# Patient Record
Sex: FEMALE | Race: WHITE | NOT HISPANIC OR LATINO | Employment: OTHER | URBAN - METROPOLITAN AREA
[De-identification: names, ages, dates, MRNs, and addresses within clinical notes are randomized per-mention and may not be internally consistent; named-entity substitution may affect disease eponyms.]

---

## 2019-09-20 ENCOUNTER — HOSPITAL ENCOUNTER (OUTPATIENT)
Dept: URGENT CARE | Facility: CLINIC | Age: 64
Discharge: HOME | End: 2019-09-20
Attending: FAMILY MEDICINE

## 2020-09-25 ENCOUNTER — HOSPITAL ENCOUNTER (OUTPATIENT)
Dept: URGENT CARE | Facility: CLINIC | Age: 65
Discharge: HOME | End: 2020-09-25
Attending: FAMILY MEDICINE

## 2022-01-11 ENCOUNTER — TRANSCRIBE ORDERS (OUTPATIENT)
Dept: ADMINISTRATIVE | Facility: HOSPITAL | Age: 67
End: 2022-01-11

## 2022-01-11 DIAGNOSIS — Z12.31 SCREENING MAMMOGRAM, ENCOUNTER FOR: Primary | ICD-10-CM

## 2022-02-21 ENCOUNTER — CLINICAL SUPPORT (OUTPATIENT)
Dept: GASTROENTEROLOGY | Facility: CLINIC | Age: 67
End: 2022-02-21

## 2022-02-21 RX ORDER — LORATADINE 10 MG/1
10 TABLET ORAL DAILY
COMMUNITY
Start: 2022-01-10 | End: 2022-11-12

## 2022-02-21 RX ORDER — PAROXETINE HYDROCHLORIDE 20 MG/1
10 TABLET, FILM COATED ORAL NIGHTLY
COMMUNITY
Start: 2022-01-10

## 2022-02-22 ENCOUNTER — APPOINTMENT (OUTPATIENT)
Dept: MAMMOGRAPHY | Facility: HOSPITAL | Age: 67
End: 2022-02-22

## 2022-02-25 ENCOUNTER — APPOINTMENT (OUTPATIENT)
Dept: MAMMOGRAPHY | Facility: HOSPITAL | Age: 67
End: 2022-02-25

## 2022-03-07 ENCOUNTER — APPOINTMENT (OUTPATIENT)
Dept: MAMMOGRAPHY | Facility: HOSPITAL | Age: 67
End: 2022-03-07

## 2022-03-15 ENCOUNTER — OFFICE VISIT (OUTPATIENT)
Dept: GASTROENTEROLOGY | Facility: CLINIC | Age: 67
End: 2022-03-15

## 2022-03-15 ENCOUNTER — LAB (OUTPATIENT)
Dept: LAB | Facility: HOSPITAL | Age: 67
End: 2022-03-15

## 2022-03-15 ENCOUNTER — PREP FOR SURGERY (OUTPATIENT)
Dept: OTHER | Facility: HOSPITAL | Age: 67
End: 2022-03-15

## 2022-03-15 VITALS
DIASTOLIC BLOOD PRESSURE: 60 MMHG | WEIGHT: 136 LBS | BODY MASS INDEX: 22.66 KG/M2 | SYSTOLIC BLOOD PRESSURE: 138 MMHG | HEIGHT: 65 IN | HEART RATE: 70 BPM

## 2022-03-15 DIAGNOSIS — R19.7 DIARRHEA, UNSPECIFIED TYPE: Primary | ICD-10-CM

## 2022-03-15 DIAGNOSIS — R15.9 INCONTINENCE OF FECES WITH FECAL URGENCY: ICD-10-CM

## 2022-03-15 DIAGNOSIS — R15.2 INCONTINENCE OF FECES WITH FECAL URGENCY: ICD-10-CM

## 2022-03-15 PROBLEM — J30.1 ALLERGIC RHINITIS DUE TO POLLEN: Status: ACTIVE | Noted: 2022-03-15

## 2022-03-15 PROBLEM — E78.2 MIXED HYPERLIPIDEMIA: Status: RESOLVED | Noted: 2022-03-15 | Resolved: 2022-03-15

## 2022-03-15 PROBLEM — F41.9 ANXIETY: Status: ACTIVE | Noted: 2022-03-15

## 2022-03-15 PROBLEM — C53.9 CERVICAL CANCER (HCC): Status: ACTIVE | Noted: 2022-03-15

## 2022-03-15 PROBLEM — E78.2 MIXED HYPERLIPIDEMIA: Status: ACTIVE | Noted: 2022-03-15

## 2022-03-15 PROBLEM — E55.9 VITAMIN D DEFICIENCY: Status: ACTIVE | Noted: 2022-03-15

## 2022-03-15 LAB
ANION GAP SERPL CALCULATED.3IONS-SCNC: 9.1 MMOL/L (ref 5–15)
BUN SERPL-MCNC: 14 MG/DL (ref 8–23)
BUN/CREAT SERPL: 22.6 (ref 7–25)
CALCIUM SPEC-SCNC: 10.1 MG/DL (ref 8.6–10.5)
CHLORIDE SERPL-SCNC: 103 MMOL/L (ref 98–107)
CO2 SERPL-SCNC: 27.9 MMOL/L (ref 22–29)
CREAT SERPL-MCNC: 0.62 MG/DL (ref 0.57–1)
EGFRCR SERPLBLD CKD-EPI 2021: 97.7 ML/MIN/1.73
GLUCOSE SERPL-MCNC: 76 MG/DL (ref 65–99)
POTASSIUM SERPL-SCNC: 4.7 MMOL/L (ref 3.5–5.2)
SODIUM SERPL-SCNC: 140 MMOL/L (ref 136–145)

## 2022-03-15 PROCEDURE — 80048 BASIC METABOLIC PNL TOTAL CA: CPT | Performed by: NURSE PRACTITIONER

## 2022-03-15 PROCEDURE — 36415 COLL VENOUS BLD VENIPUNCTURE: CPT | Performed by: NURSE PRACTITIONER

## 2022-03-15 PROCEDURE — 99204 OFFICE O/P NEW MOD 45 MIN: CPT | Performed by: NURSE PRACTITIONER

## 2022-03-15 RX ORDER — SODIUM, POTASSIUM,MAG SULFATES 17.5-3.13G
1 SOLUTION, RECONSTITUTED, ORAL ORAL EVERY 12 HOURS
Qty: 354 ML | Refills: 0 | Status: ON HOLD | OUTPATIENT
Start: 2022-03-15 | End: 2022-07-12

## 2022-03-15 RX ORDER — MULTIPLE VITAMINS W/ MINERALS TAB 9MG-400MCG
1 TAB ORAL EVERY 24 HOURS
COMMUNITY

## 2022-03-15 NOTE — PROGRESS NOTES
Patient Name: Ivory Nava   Visit Date: 03/15/2022   Patient ID: [id number]  Provider: CATHI Cox    Sex: female  Location:  Location Address:  Location Phone: [address]  [place] [address] [phone]    YOB: 1955  Age: 66 y.o.   Primary Care Provider Darrian Oreilly MD      Referring Provider: No ref. provider found        Chief Complaint  Diarrhea (Sx since she was dx with cervical cancer; 2008, pt has never had a colonoscopy, was advised a Cologuard but did not complete as she did not feel comfortable send the sample, previous stool studies) and Abdominal Cramping    History of Present Illness    New pt states she has had diarrhea since 2008 after having chemo/radiation for cervical cancer. Pt states she may have normal stools as well, alt w diarrhea, but may have FI w urge at times.   No abd pain, does have cramps before diarrhea. No blood in stool or black stool, no n/v, good appetite.   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 Stable weight recently.   [place] #5 usually. Takes OTC loperamide PRN Stool has been oily at times.   Pt states she has never had a colonoscopy. She requested not to have unless last resort.      Past Medical History:   Diagnosis Date   • Anxiety    • Cervical cancer (HCC)    • Seasonal allergies        Past Surgical History:   Procedure Laterality Date   • BRAIN TUMOR EXCISION         Allergies   Allergen Reactions   • Penicillins Anaphylaxis   • Lorazepam Mental Status Change     Suicidal   • Sulfa Antibiotics Nausea Only       Family History   Problem Relation Age of Onset   • Colon cancer Neg Hx         Social History     Tobacco Use   • Smoking status: Current Every Day Smoker     Packs/day: 1.00     Years: 40.00     Pack years: 40.00     Types: Cigarettes   • Smokeless tobacco: Never Used   Vaping Use   • Vaping Use: Never used   Substance Use Topics   •  "Alcohol use: Not Currently   • Drug use: Defer       Objective     Vital Signs:   /60 (BP Location: Left arm, Patient Position: Sitting, Cuff Size: Adult)   Pulse 70   Ht 165.1 cm (65\")   Wt 61.7 kg (136 lb)   BMI 22.63 kg/m²       Physical Exam  Constitutional:       General: The patient is not in acute distress.     Appearance: Normal appearance.   HENT:      Head: Normocephalic and atraumatic.      Nose: Nose normal.   Pulmonary:      Effort: Pulmonary effort is normal. No respiratory distress.   Abdominal:      General: Abdomen is flat.      Palpations: Abdomen is soft. There is no mass.      Tenderness: There is no abdominal tenderness. There is no guarding.   Musculoskeletal:      Cervical back: Neck supple.      Right lower leg: No edema.      Left lower leg: No edema.   Skin:     General: Skin is warm and dry.   Neurological:      General: No focal deficit present.      Mental Status: The patient is alert and oriented to person, place, and time.      Gait: Gait normal.   Psychiatric:         Mood and Affect: Mood normal.         Speech: Speech normal.         Behavior: Behavior normal.         Thought Content: Thought content normal.     Result Review :   The following data was reviewed by: CATHI Cox on 03/15/2022:                        Assessment and Plan    Diagnoses and all orders for this visit:    1. Diarrhea, unspecified type (Primary)  -     Enteric Bacterial Panel - Stool, Per Rectum; Future  -     Enteric Parasite Panel - Stool, Per Rectum; Future  -     Fecal Lactoferrin Qual. - Stool, Per Rectum; Future  -     Clostridium Difficile Toxin - Stool, Per Rectum; Future  -     Occult Blood, Fecal By Immunoassay - Stool, Per Rectum; Future  -     Basic Metabolic Panel  -     Celiac Disease Panel; Future    2. Incontinence of feces with fecal urgency    Other orders  -     sodium-potassium-magnesium sulfates (Suprep Bowel Prep Kit) 17.5-3.13-1.6 GM/177ML solution oral " solution; Take 1 bottle by mouth Every 12 (Twelve) Hours.  Dispense: 354 mL; Refill: 0            Follow Up      Stool studies to r/o any infections.   Check BMP for low volume prep and celiac screen  D/W pt w colonoscopy is the only way to r/o colon cancer, colitis, IBD. Pt verb understanding   Colonoscopy Surgical Risk and Benefits: Possible risks/complications, benefits, and alternatives to surgical or invasive procedure have been explained to patient and/or legal guardian; risks include bleeding, infection, and perforation. Patient has been evaluated and can tolerate anesthesia and/or sedation. Risks, benefits, and alternatives to anesthesia and sedation have been explained to patient and/or legal guardian.     Patient was given instructions and counseling regarding her condition or for health maintenance advice. Please see specific information pulled into the AVS if appropriate.

## 2022-03-24 ENCOUNTER — PATIENT ROUNDING (BHMG ONLY) (OUTPATIENT)
Dept: GASTROENTEROLOGY | Facility: CLINIC | Age: 67
End: 2022-03-24

## 2022-03-24 NOTE — PROGRESS NOTES
March 24, 2022    Hello, may I speak with Ivory Nava?    My name is Andria      I am  with [facility]  [address]  [place] [address] [phone].    Before we get started may I verify your date of birth? 1955    I am calling to officially welcome you to our practice and ask about your recent visit. Is this a good time to talk? No voicemail left ok per consent     Tell me about your visit with us. What things went well?         We're always looking for ways to make our patients' experiences even better. Do you have recommendations on ways we may improve?      Overall were you satisfied with your first visit to our practice?       I appreciate you taking the time to speak with me today. Is there anything else I can do for you?       Thank you, and have a great day.

## 2022-05-04 ENCOUNTER — TELEPHONE (OUTPATIENT)
Dept: GASTROENTEROLOGY | Facility: CLINIC | Age: 67
End: 2022-05-04

## 2022-05-04 NOTE — TELEPHONE ENCOUNTER
Pt called to cancel her colon that was scheduled for 5.6.22 due to not having transportation. Pt did not want to reschedule at this time, she would like to call back when she will have transportation to reschedule. Pt was also informed that appts are pushed out into July at this time. Pt will call back to reschedule.

## 2022-05-11 ENCOUNTER — TELEPHONE (OUTPATIENT)
Dept: GASTROENTEROLOGY | Facility: CLINIC | Age: 67
End: 2022-05-11

## 2022-06-10 ENCOUNTER — TELEPHONE (OUTPATIENT)
Dept: GASTROENTEROLOGY | Facility: CLINIC | Age: 67
End: 2022-06-10

## 2022-06-10 NOTE — TELEPHONE ENCOUNTER
"Called pt to follow up on overdue results for laboratory tests/stool studies. Pt would like to \"think about\" doing these tests. She asked that I postpone orders for one month.   "

## 2022-07-11 ENCOUNTER — TELEPHONE (OUTPATIENT)
Dept: GASTROENTEROLOGY | Facility: CLINIC | Age: 67
End: 2022-07-11

## 2022-07-11 NOTE — PRE-PROCEDURE INSTRUCTIONS
PT INSTRUCTED ON CLEAR LIQ DIET AND PO SPLIT PREP LAST BM SHOULD BE CLEAR  PT CAN TAKE allergy med    WITH A SIP OF WATER IN THE AM OF THE PROCEDURE ARRIVAL TIME IS  1030 am  ON 7/12/22

## 2022-07-11 NOTE — TELEPHONE ENCOUNTER
Left message with patient asking for a returned call to follow up on overdue results for stool studies/lab tests.        I will cx orders and send letter to patient.

## 2022-07-12 ENCOUNTER — ANESTHESIA (OUTPATIENT)
Dept: GASTROENTEROLOGY | Facility: HOSPITAL | Age: 67
End: 2022-07-12

## 2022-07-12 ENCOUNTER — ANESTHESIA EVENT (OUTPATIENT)
Dept: GASTROENTEROLOGY | Facility: HOSPITAL | Age: 67
End: 2022-07-12

## 2022-07-12 ENCOUNTER — HOSPITAL ENCOUNTER (OUTPATIENT)
Facility: HOSPITAL | Age: 67
Setting detail: HOSPITAL OUTPATIENT SURGERY
Discharge: HOME | End: 2022-07-12
Attending: INTERNAL MEDICINE | Admitting: INTERNAL MEDICINE

## 2022-07-12 VITALS
WEIGHT: 125.66 LBS | BODY MASS INDEX: 20.94 KG/M2 | OXYGEN SATURATION: 96 % | SYSTOLIC BLOOD PRESSURE: 132 MMHG | TEMPERATURE: 97.5 F | DIASTOLIC BLOOD PRESSURE: 64 MMHG | HEART RATE: 65 BPM | HEIGHT: 65 IN | RESPIRATION RATE: 14 BRPM

## 2022-07-12 DIAGNOSIS — R15.9 INCONTINENCE OF FECES WITH FECAL URGENCY: ICD-10-CM

## 2022-07-12 DIAGNOSIS — R19.7 DIARRHEA, UNSPECIFIED TYPE: ICD-10-CM

## 2022-07-12 DIAGNOSIS — R15.2 INCONTINENCE OF FECES WITH FECAL URGENCY: ICD-10-CM

## 2022-07-12 PROCEDURE — 45380 COLONOSCOPY AND BIOPSY: CPT | Performed by: INTERNAL MEDICINE

## 2022-07-12 PROCEDURE — 88305 TISSUE EXAM BY PATHOLOGIST: CPT | Performed by: INTERNAL MEDICINE

## 2022-07-12 PROCEDURE — 45385 COLONOSCOPY W/LESION REMOVAL: CPT | Performed by: INTERNAL MEDICINE

## 2022-07-12 PROCEDURE — 25010000002 PROPOFOL 10 MG/ML EMULSION: Performed by: NURSE ANESTHETIST, CERTIFIED REGISTERED

## 2022-07-12 RX ORDER — PROPOFOL 10 MG/ML
VIAL (ML) INTRAVENOUS AS NEEDED
Status: DISCONTINUED | OUTPATIENT
Start: 2022-07-12 | End: 2022-07-12 | Stop reason: SURG

## 2022-07-12 RX ORDER — SODIUM CHLORIDE, SODIUM LACTATE, POTASSIUM CHLORIDE, CALCIUM CHLORIDE 600; 310; 30; 20 MG/100ML; MG/100ML; MG/100ML; MG/100ML
30 INJECTION, SOLUTION INTRAVENOUS CONTINUOUS
Status: DISCONTINUED | OUTPATIENT
Start: 2022-07-12 | End: 2022-07-12 | Stop reason: HOSPADM

## 2022-07-12 RX ORDER — LIDOCAINE HYDROCHLORIDE 20 MG/ML
INJECTION, SOLUTION EPIDURAL; INFILTRATION; INTRACAUDAL; PERINEURAL AS NEEDED
Status: DISCONTINUED | OUTPATIENT
Start: 2022-07-12 | End: 2022-07-12 | Stop reason: SURG

## 2022-07-12 RX ADMIN — PROPOFOL 200 MCG/KG/MIN: 10 INJECTION, EMULSION INTRAVENOUS at 12:47

## 2022-07-12 RX ADMIN — PROPOFOL 125 MG: 10 INJECTION, EMULSION INTRAVENOUS at 12:47

## 2022-07-12 RX ADMIN — SODIUM CHLORIDE, POTASSIUM CHLORIDE, SODIUM LACTATE AND CALCIUM CHLORIDE 30 ML/HR: 600; 310; 30; 20 INJECTION, SOLUTION INTRAVENOUS at 12:03

## 2022-07-12 RX ADMIN — LIDOCAINE HYDROCHLORIDE 50 MG: 20 INJECTION, SOLUTION EPIDURAL; INFILTRATION; INTRACAUDAL; PERINEURAL at 12:47

## 2022-07-12 NOTE — ANESTHESIA PREPROCEDURE EVALUATION
Anesthesia Evaluation     Patient summary reviewed and Nursing notes reviewed                Airway   Mallampati: I  TM distance: >3 FB  Neck ROM: full  No difficulty expected  Dental      Pulmonary - negative pulmonary ROS and normal exam    breath sounds clear to auscultation  Cardiovascular - negative cardio ROS and normal exam    Rhythm: regular  Rate: normal        Neuro/Psych  (+) psychiatric history Anxiety,    GI/Hepatic/Renal/Endo    (+)  GERD,      Musculoskeletal (-) negative ROS    Abdominal    Substance History - negative use     OB/GYN negative ob/gyn ROS         Other      history of cancer                    Anesthesia Plan    ASA 1     general     intravenous induction     Anesthetic plan, risks, benefits, and alternatives have been provided, discussed and informed consent has been obtained with: patient.        CODE STATUS:

## 2022-07-12 NOTE — H&P
"Pre Procedure History & Physical    Chief Complaint:   Diarrhea    Subjective     HPI:   Diarrhea    Past Medical History:   Past Medical History:   Diagnosis Date   • Anxiety    • Brain tumor (benign) (HCC)    • Cervical cancer (HCC)    • GERD (gastroesophageal reflux disease)    • History of chemotherapy    • History of radiation therapy    • Seasonal allergies        Past Surgical History:  Past Surgical History:   Procedure Laterality Date   • BRAIN TUMOR EXCISION         Family History:  Family History   Problem Relation Age of Onset   • Cancer Mother    • Colon cancer Neg Hx    • Malig Hyperthermia Neg Hx        Social History:   reports that she has been smoking cigarettes. She has a 40.00 pack-year smoking history. She has never used smokeless tobacco. She reports previous alcohol use. Drug use questions deferred to the physician.    Medications:   Medications Prior to Admission   Medication Sig Dispense Refill Last Dose   • Allergy Relief 10 MG tablet Take 10 mg by mouth Daily.   7/12/2022 at Unknown time   • multivitamin with minerals tablet tablet Take 1 tablet by mouth Daily.   Past Week at Unknown time   • PARoxetine (PAXIL) 20 MG tablet Take 10 mg by mouth Every Night.   7/11/2022 at Unknown time       Allergies:  Penicillins, Lorazepam, and Sulfa antibiotics        Objective     Blood pressure 142/74, pulse 66, temperature 98.7 °F (37.1 °C), temperature source Temporal, resp. rate 16, height 165.1 cm (65\"), weight 57 kg (125 lb 10.6 oz), SpO2 97 %.    Physical Exam   Constitutional: Pt is oriented to person, place, and time and well-developed, well-nourished, and in no distress.   Mouth/Throat: Oropharynx is clear and moist.   Neck: Normal range of motion.   Cardiovascular: Normal rate, regular rhythm and normal heart sounds.    Pulmonary/Chest: Effort normal and breath sounds normal.   Abdominal: Soft. Nontender  Skin: Skin is warm and dry.   Psychiatric: Mood, memory, affect and judgment normal. "     Assessment & Plan     Diagnosis:  Diarrhea    Anticipated Surgical Procedure:  Colonoscopy    The risks, benefits, and alternatives of this procedure have been discussed with the patient or the responsible party- the patient understands and agrees to proceed.

## 2022-07-12 NOTE — ANESTHESIA POSTPROCEDURE EVALUATION
Patient: Ivory Nava    Procedure Summary     Date: 07/12/22 Room / Location: [facility] ENDOSCOPY 4 / [facility] ENDOSCOPY    Anesthesia Start: 1245 Anesthesia Stop: 1316    Procedure: COLONOSCOPY WITH POLYPECTOMY/SNARE/BIOPSIES (N/A ) Diagnosis:       Diarrhea, unspecified type      Incontinence of feces with fecal urgency      (Diarrhea, unspecified type [R19.7])      (Incontinence of feces with fecal urgency [R15.9, R15.2])    Surgeons: Darrian Almanzar MD Provider: Tha Lipscomb MD    Anesthesia Type: general ASA Status: 1          Anesthesia Type: general    Vitals  Vitals Value Taken Time   /64 07/12/22 1344   Temp 36.4 °C (97.5 °F) 07/12/22 1344   Pulse 65 07/12/22 1344   Resp 14 07/12/22 1344   SpO2 96 % 07/12/22 1344           Post Anesthesia Care and Evaluation    Patient location during evaluation: bedside  Patient participation: complete - patient participated  Level of consciousness: awake  Pain management: adequate    Airway patency: patent  Anesthetic complications: No anesthetic complications  PONV Status: none  Cardiovascular status: acceptable and stable  Respiratory status: acceptable  Hydration status: acceptable    Comments: An Anesthesiologist personally participated in the most demanding procedures (including induction and emergence if applicable) in the anesthesia plan, monitored the course of anesthesia administration at frequent intervals and remained physically present and available for immediate diagnosis and treatment of emergencies.

## 2022-07-14 LAB
CYTO UR: NORMAL
LAB AP CASE REPORT: NORMAL
LAB AP CLINICAL INFORMATION: NORMAL
PATH REPORT.FINAL DX SPEC: NORMAL
PATH REPORT.GROSS SPEC: NORMAL

## 2022-07-21 ENCOUNTER — TELEPHONE (OUTPATIENT)
Dept: GASTROENTEROLOGY | Facility: CLINIC | Age: 67
End: 2022-07-21

## 2022-07-21 NOTE — TELEPHONE ENCOUNTER
----- Message from CATHI Cox sent at 7/21/2022  2:27 PM EDT -----  Radiation proctitis noted, recall colonoscopy 5 years, random colon biopsies were negative and rectal biopsy was negative, small polyp removed from the cecum was benign.  I ordered stool studies on patient initially for diarrhea, if she is still having recommend that she do the stools, schedule follow-up if still with symptoms

## 2022-07-21 NOTE — TELEPHONE ENCOUNTER
Recall added, tried to call pt with results/recommendations. No answer, left message to call office back.

## 2022-10-03 ENCOUNTER — HOSPITAL ENCOUNTER (EMERGENCY)
Facility: HOSPITAL | Age: 67
Discharge: HOME | End: 2022-10-03
Attending: EMERGENCY MEDICINE | Admitting: EMERGENCY MEDICINE

## 2022-10-03 ENCOUNTER — APPOINTMENT (OUTPATIENT)
Dept: CT IMAGING | Facility: HOSPITAL | Age: 67
End: 2022-10-03

## 2022-10-03 VITALS
RESPIRATION RATE: 16 BRPM | OXYGEN SATURATION: 98 % | HEIGHT: 65 IN | HEART RATE: 67 BPM | SYSTOLIC BLOOD PRESSURE: 110 MMHG | WEIGHT: 134.92 LBS | TEMPERATURE: 98.3 F | DIASTOLIC BLOOD PRESSURE: 58 MMHG | BODY MASS INDEX: 22.48 KG/M2

## 2022-10-03 DIAGNOSIS — M54.2 NECK PAIN ON RIGHT SIDE: Primary | ICD-10-CM

## 2022-10-03 DIAGNOSIS — J32.9 SINUSITIS, UNSPECIFIED CHRONICITY, UNSPECIFIED LOCATION: ICD-10-CM

## 2022-10-03 DIAGNOSIS — H66.90 ACUTE OTITIS MEDIA, UNSPECIFIED OTITIS MEDIA TYPE: ICD-10-CM

## 2022-10-03 DIAGNOSIS — R09.81 NASAL CONGESTION: ICD-10-CM

## 2022-10-03 PROCEDURE — 99282 EMERGENCY DEPT VISIT SF MDM: CPT

## 2022-10-03 RX ORDER — AZITHROMYCIN 250 MG/1
TABLET, FILM COATED ORAL
Qty: 6 TABLET | Refills: 0 | Status: SHIPPED | OUTPATIENT
Start: 2022-10-03 | End: 2022-11-12

## 2022-10-03 RX ORDER — ORPHENADRINE CITRATE 30 MG/ML
60 INJECTION INTRAMUSCULAR; INTRAVENOUS ONCE
Status: DISCONTINUED | OUTPATIENT
Start: 2022-10-03 | End: 2022-10-03 | Stop reason: HOSPADM

## 2022-10-03 RX ORDER — ACETAMINOPHEN 325 MG/1
975 TABLET ORAL ONCE
Status: DISCONTINUED | OUTPATIENT
Start: 2022-10-03 | End: 2022-10-03 | Stop reason: HOSPADM

## 2022-10-03 NOTE — DISCHARGE INSTRUCTIONS
Follow-up with your primary care provider in 3 to 5 days if you continue to feel ill.  Please note that the head CT was ordered for you to have completed and is still suggested.  Your primary care provider could order you another 1 to be completed outpatient.  Please take all the antibiotics that have been prescribed today until they are gone.  Increase your oral fluid intake particularly water.  Return to the ER if you develop the worst headache of your life, develop any confusion, have a change in vision, or if you develop severe nausea, vomiting, or diarrhea.

## 2022-10-03 NOTE — ED PROVIDER NOTES
Room number: 60/60    Chief Complaint: neck and head pain    Time: 1:33 PM EDT  Arrived by: PERRY  History provided by: Pt  History is limited by: N/A     History of Present Illness:  Patient is a 66 y.o. year old female that presents to the emergency department after being referred here from local urgent care center for a right sided ear pain, neck pain, and headache/pain x 1 week ago.  Patient states she feels that her ear is hurting due to a sinus infection and the fact that she has been prone to ear infections in the past.  However, her head pain and right-sided neck pain started last week after being hit in the head with a basketball.  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  Her current head pain is a 7 on a scale of 0-10.  She describes it as an aching sensation she also reports mild dizziness that she contributes to her inner ear issues    HPI    Similar Symptoms Previously: Yes  Recently seen: Yes    Patient Care Team  Primary Care Provider: Darrian Oreilly MD    Past Medical History:   Allergies   Allergen Reactions   • Penicillins Anaphylaxis   • Lorazepam Mental Status Change and Unknown - High Severity     Suicidal   • Sulfa Antibiotics Nausea Only and Unknown - High Severity     Past Medical History:   Diagnosis Date   • Anxiety    • Brain tumor (benign) (HCC)    • Cervical cancer (HCC)    • GERD (gastroesophageal reflux disease)    • History of chemotherapy    • History of radiation therapy    • Seasonal allergies      Past Surgical History:   Procedure Laterality Date   • BRAIN TUMOR EXCISION     • COLONOSCOPY N/A 7/12/2022    Procedure: COLONOSCOPY WITH POLYPECTOMY/SNARE/BIOPSIES;  Surgeon: Darrian Almanzar MD;  Location: [facility] ENDOSCOPY;  Service:  "Gastroenterology;  Laterality: N/A;  COLON POLYP  PROCTITIS     Family History   Problem Relation Age of Onset   • Cancer Mother    • Colon cancer Neg Hx    • Malig Hyperthermia Neg Hx        Home Medications:  Prior to Admission medications    Medication Sig Start Date End Date Taking? Authorizing Provider   Allergy Relief 10 MG tablet Take 10 mg by mouth Daily. 1/10/22   Evelia Castro MD   multivitamin with minerals tablet tablet Take 1 tablet by mouth Daily.    Evelia Castro MD   PARoxetine (PAXIL) 20 MG tablet Take 10 mg by mouth Every Night. 1/10/22   Evelia Castro MD        Social History:   Social History     Tobacco Use   • Smoking status: Current Every Day Smoker     Packs/day: 1.00     Years: 40.00     Pack years: 40.00     Types: Cigarettes   • Smokeless tobacco: Never Used   Vaping Use   • Vaping Use: Never used   Substance Use Topics   • Alcohol use: Not Currently   • Drug use: Defer       Review of Systems  Review of Systems   Constitutional: Negative for chills and fever.   HENT: Positive for ear pain, sinus pressure and sinus pain. Negative for congestion and sore throat.    Eyes: Negative for pain.   Respiratory: Positive for cough. Negative for chest tightness and shortness of breath.    Cardiovascular: Negative for chest pain.   Gastrointestinal: Negative for abdominal pain, diarrhea, nausea and vomiting.   Genitourinary: Negative for flank pain and hematuria.   Musculoskeletal: Negative for joint swelling.   Skin: Negative for pallor.   Neurological: Positive for dizziness and headaches. Negative for seizures.   All other systems reviewed and are negative.       Physical Exam:   /58 (BP Location: Right arm, Patient Position: Sitting)   Pulse 67   Temp 98.3 °F (36.8 °C) (Oral)   Resp 16   Ht 165.1 cm (65\")   Wt 61.2 kg (134 lb 14.7 oz)   SpO2 98%   BMI 22.45 kg/m²     Physical Exam  Vitals and nursing note reviewed.   Constitutional:       General: She is not " in acute distress.     Appearance: Normal appearance. She is not toxic-appearing.   HENT:      Head: Normocephalic and atraumatic.        Left Ear: Tympanic membrane normal.      Ears:      Comments: Right tympanic membrane her red in nature and boggy in appearance.  Canal pink     Mouth/Throat:      Mouth: Mucous membranes are moist.   Eyes:      General: No scleral icterus.     Extraocular Movements: Extraocular movements intact.      Pupils: Pupils are equal, round, and reactive to light.   Cardiovascular:      Rate and Rhythm: Normal rate and regular rhythm.      Pulses: Normal pulses.      Heart sounds: Normal heart sounds.   Pulmonary:      Effort: Pulmonary effort is normal. No respiratory distress.      Breath sounds: Normal breath sounds.   Abdominal:      General: Abdomen is flat.      Palpations: Abdomen is soft.      Tenderness: There is no abdominal tenderness.   Musculoskeletal:         General: Tenderness present. Normal range of motion.      Cervical back: Normal range of motion and neck supple.      Comments: Tenderness to right sided neck and right upper trap area   Skin:     General: Skin is warm and dry.      Capillary Refill: Capillary refill takes less than 2 seconds.      Coloration: Skin is not jaundiced or pale.      Findings: No bruising, erythema, lesion or rash.   Neurological:      Mental Status: She is alert and oriented to person, place, and time. Mental status is at baseline.      Sensory: No sensory deficit.      Coordination: Coordination normal.   Psychiatric:         Mood and Affect: Mood normal.         Behavior: Behavior normal.         Thought Content: Thought content normal.         Judgment: Judgment normal.                Medications in the Emergency Department:  Medications - No data to display     Labs  Lab Results (last 24 hours)     ** No results found for the last 24 hours. **           Imaging:  No Radiology Exams Resulted Within Past 24  Hours    Procedures:  Procedures    Progress  ED Course as of 10/04/22 1527   Mon Oct 03, 2022   1354 Pt requesting not to take the medications  [MS]   1507 Pt is not wanting to wait for head CT and asked that the order be canceled. She was highly encouraged to have a CT completed or to at least have her primary care provider order an outpatient CT due to her history and being hit in a spot or her surgical procedure was completed [MS]      ED Course User Index  [MS] Mary Rodriguez, CATHI                            Medical Decision Making:  MDM  Number of Diagnoses or Management Options  Acute otitis media, unspecified otitis media type (right): new and does not require workup  Nasal congestion: new and does not require workup  Neck pain on right side: new and does not require workup  Sinusitis, unspecified chronicity, unspecified location: new and does not require workup  Diagnosis management comments: Patient is a 66-year-old female that presents to the emergency department today with headache, right-sided neck pain, sinus complaints, and right ear pain.  She has also had intermittent dizziness since being struck in the head with a basketball 1 week ago.  She was referred here to the emergency department for CT imaging of her head due to her mild headache and dizziness with a history of brain tumor removal.  CT without contrast was ordered for patient however she requested that the order be canceled and did not want to wait to have it completed.  She was also offered medication for her discomfort in declined that as well.  Patient was educated on the risk and benefits of imaging with her symptoms and history and she still declined having films completed.  On exam it was noted that she had right otitis media which she was prescribed an oral antibiotic for    I have spoke with the patient and I have explained the patient´s condition, diagnoses and treatment plan based on the information available to me at this  time. I have answered all questions and addressed any concerns. The patient has a good understanding of the patient´s diagnosis, condition, and treatment plan as can be expected at this point. The vital signs have been stable. The patient´s condition is stable and appropriate for discharge from the emergency department.      The patient will pursue further outpatient evaluation with the primary care physician or other designated or consulting physician as outlined in the discharge instructions. They are agreeable to this plan of care and follow-up instructions have been explained in detail. The patient has received these instructions in written format and have expressed an understanding of the discharge instructions. The patient is aware that any significant change in condition or worsening of symptoms should prompt an immediate return to this or the closest emergency department or call to 911.         Amount and/or Complexity of Data Reviewed  Tests in the radiology section of CPT®: ordered  Review and summarize past medical records: yes (I have personally reviewed patient's previous medical encounters.)    Risk of Complications, Morbidity, and/or Mortality  Presenting problems: moderate  Diagnostic procedures: low  Management options: low    Patient Progress  Patient progress: stable       Final diagnoses:   Neck pain on right side   Acute otitis media, unspecified otitis media type (right)   Sinusitis, unspecified chronicity, unspecified location   Nasal congestion        Disposition:  ED Disposition     ED Disposition   Discharge    Condition   Stable    Comment   --             Prescriptions:       Medication List      START taking these medications    azithromycin 250 MG tablet  Commonly known as: Zithromax Z-Jae  Take 2 tablets by mouth on day 1, then 1 tablet daily on days 2-5        CONTINUE taking these medications    Allergy Relief 10 MG tablet  Generic drug: loratadine     multivitamin with minerals  tablet tablet     PARoxetine 20 MG tablet  Commonly known as: PAXIL           Where to Get Your Medications      These medications were sent to [facility] - [place], [place] - [address] [facility] - [phone]  - [phone]  [address] [place] [address]    Phone: [phone]   · azithromycin 250 MG tablet         This medical record created using voice recognition software and may contain unintended errors.     Mary Rodriguez, APRN  10/04/22 1527

## 2022-11-17 ENCOUNTER — HOSPITAL ENCOUNTER (EMERGENCY)
Facility: HOSPITAL | Age: 67
Discharge: HOME | End: 2022-11-18
Attending: EMERGENCY MEDICINE | Admitting: EMERGENCY MEDICINE

## 2022-11-17 DIAGNOSIS — R10.84 GENERALIZED ABDOMINAL PAIN: Primary | ICD-10-CM

## 2022-11-17 DIAGNOSIS — K21.9 GASTROESOPHAGEAL REFLUX DISEASE WITHOUT ESOPHAGITIS: ICD-10-CM

## 2022-11-17 LAB
ALBUMIN SERPL-MCNC: 4.3 G/DL (ref 3.5–5.2)
ALBUMIN/GLOB SERPL: 1.4 G/DL
ALP SERPL-CCNC: 88 U/L (ref 39–117)
ALT SERPL W P-5'-P-CCNC: 11 U/L (ref 1–33)
ANION GAP SERPL CALCULATED.3IONS-SCNC: 7 MMOL/L (ref 5–15)
AST SERPL-CCNC: 18 U/L (ref 1–32)
BASOPHILS # BLD AUTO: 0.01 10*3/MM3 (ref 0–0.2)
BASOPHILS NFR BLD AUTO: 0.1 % (ref 0–1.5)
BILIRUB SERPL-MCNC: 0.3 MG/DL (ref 0–1.2)
BILIRUB UR QL STRIP: NEGATIVE
BUN SERPL-MCNC: 12 MG/DL (ref 8–23)
BUN/CREAT SERPL: 18.8 (ref 7–25)
CALCIUM SPEC-SCNC: 9.8 MG/DL (ref 8.6–10.5)
CHLORIDE SERPL-SCNC: 102 MMOL/L (ref 98–107)
CLARITY UR: CLEAR
CO2 SERPL-SCNC: 29 MMOL/L (ref 22–29)
COLOR UR: YELLOW
CREAT SERPL-MCNC: 0.64 MG/DL (ref 0.57–1)
D-LACTATE SERPL-SCNC: 0.8 MMOL/L (ref 0.5–2)
DEPRECATED RDW RBC AUTO: 44.9 FL (ref 37–54)
EGFRCR SERPLBLD CKD-EPI 2021: 97 ML/MIN/1.73
EOSINOPHIL # BLD AUTO: 0.11 10*3/MM3 (ref 0–0.4)
EOSINOPHIL NFR BLD AUTO: 1.6 % (ref 0.3–6.2)
ERYTHROCYTE [DISTWIDTH] IN BLOOD BY AUTOMATED COUNT: 13.2 % (ref 12.3–15.4)
GLOBULIN UR ELPH-MCNC: 3 GM/DL
GLUCOSE SERPL-MCNC: 111 MG/DL (ref 65–99)
GLUCOSE UR STRIP-MCNC: NEGATIVE MG/DL
HCT VFR BLD AUTO: 40 % (ref 34–46.6)
HGB BLD-MCNC: 13.3 G/DL (ref 12–15.9)
HGB UR QL STRIP.AUTO: NEGATIVE
HOLD SPECIMEN: NORMAL
HOLD SPECIMEN: NORMAL
IMM GRANULOCYTES # BLD AUTO: 0.02 10*3/MM3 (ref 0–0.05)
IMM GRANULOCYTES NFR BLD AUTO: 0.3 % (ref 0–0.5)
KETONES UR QL STRIP: NEGATIVE
LEUKOCYTE ESTERASE UR QL STRIP.AUTO: NEGATIVE
LIPASE SERPL-CCNC: 21 U/L (ref 13–60)
LYMPHOCYTES # BLD AUTO: 1.82 10*3/MM3 (ref 0.7–3.1)
LYMPHOCYTES NFR BLD AUTO: 26.2 % (ref 19.6–45.3)
MCH RBC QN AUTO: 31 PG (ref 26.6–33)
MCHC RBC AUTO-ENTMCNC: 33.3 G/DL (ref 31.5–35.7)
MCV RBC AUTO: 93.2 FL (ref 79–97)
MONOCYTES # BLD AUTO: 0.6 10*3/MM3 (ref 0.1–0.9)
MONOCYTES NFR BLD AUTO: 8.6 % (ref 5–12)
NEUTROPHILS NFR BLD AUTO: 4.39 10*3/MM3 (ref 1.7–7)
NEUTROPHILS NFR BLD AUTO: 63.2 % (ref 42.7–76)
NITRITE UR QL STRIP: NEGATIVE
NRBC BLD AUTO-RTO: 0 /100 WBC (ref 0–0.2)
PH UR STRIP.AUTO: 5.5 [PH] (ref 5–8)
PLATELET # BLD AUTO: 264 10*3/MM3 (ref 140–450)
PMV BLD AUTO: 9.7 FL (ref 6–12)
POTASSIUM SERPL-SCNC: 4.2 MMOL/L (ref 3.5–5.2)
PROT SERPL-MCNC: 7.3 G/DL (ref 6–8.5)
PROT UR QL STRIP: NEGATIVE
RBC # BLD AUTO: 4.29 10*6/MM3 (ref 3.77–5.28)
SODIUM SERPL-SCNC: 138 MMOL/L (ref 136–145)
SP GR UR STRIP: 1.01 (ref 1–1.03)
UROBILINOGEN UR QL STRIP: NORMAL
WBC NRBC COR # BLD: 6.95 10*3/MM3 (ref 3.4–10.8)
WHOLE BLOOD HOLD COAG: NORMAL
WHOLE BLOOD HOLD SPECIMEN: NORMAL

## 2022-11-17 PROCEDURE — 85025 COMPLETE CBC W/AUTO DIFF WBC: CPT

## 2022-11-17 PROCEDURE — 81003 URINALYSIS AUTO W/O SCOPE: CPT

## 2022-11-17 PROCEDURE — 36415 COLL VENOUS BLD VENIPUNCTURE: CPT

## 2022-11-17 PROCEDURE — 80053 COMPREHEN METABOLIC PANEL: CPT

## 2022-11-17 PROCEDURE — 83605 ASSAY OF LACTIC ACID: CPT

## 2022-11-17 PROCEDURE — 83690 ASSAY OF LIPASE: CPT

## 2022-11-17 PROCEDURE — 99284 EMERGENCY DEPT VISIT MOD MDM: CPT

## 2022-11-17 RX ORDER — LIDOCAINE HYDROCHLORIDE 20 MG/ML
15 SOLUTION OROPHARYNGEAL ONCE
Status: COMPLETED | OUTPATIENT
Start: 2022-11-17 | End: 2022-11-17

## 2022-11-17 RX ORDER — PANTOPRAZOLE SODIUM 40 MG/10ML
40 INJECTION, POWDER, LYOPHILIZED, FOR SOLUTION INTRAVENOUS
Status: DISCONTINUED | OUTPATIENT
Start: 2022-11-18 | End: 2022-11-18 | Stop reason: HOSPADM

## 2022-11-17 RX ORDER — ALUMINA, MAGNESIA, AND SIMETHICONE 2400; 2400; 240 MG/30ML; MG/30ML; MG/30ML
15 SUSPENSION ORAL ONCE
Status: COMPLETED | OUTPATIENT
Start: 2022-11-17 | End: 2022-11-17

## 2022-11-17 RX ORDER — SODIUM CHLORIDE 0.9 % (FLUSH) 0.9 %
10 SYRINGE (ML) INJECTION AS NEEDED
Status: DISCONTINUED | OUTPATIENT
Start: 2022-11-17 | End: 2022-11-18 | Stop reason: HOSPADM

## 2022-11-17 RX ADMIN — LIDOCAINE HYDROCHLORIDE 15 ML: 20 SOLUTION ORAL at 23:48

## 2022-11-17 RX ADMIN — ALUMINUM HYDROXIDE, MAGNESIUM HYDROXIDE, AND DIMETHICONE 15 ML: 400; 400; 40 SUSPENSION ORAL at 23:48

## 2022-11-17 NOTE — ED PROVIDER NOTES
Time: 3:44 PM EST  Chief Complaint:   Chief Complaint   Patient presents with   • Abdominal Pain           History of Present Illness:  Patient is a 66 y.o. year old female who presents to the emergency department with abdominal pain.  She slipped and fell off her porch on Saturday was seen at urgent care.  Was having back pain.  X-rays there were negative for fracture.  He was taking taking Tylenol 500 mg 3 times a day until Tuesday when she changed over to ibuprofen 3 times a day.  She did not take any medication yesterday or today because she started having severe abdominal pain cramping and thought it was because of the medicine.   Has had some bloating, belching and reflux.  Last night when she laid down belched and felt vomit.  Denies any lower extremity numbness weakness, saddle anesthesia, bowel or bladder incontinence.  Denies any burning with urination. (Cheli Rojas PA-C provider in triage 3:45 PM EST )     Has taken Gas-X and a suppository without any improvement.  She also took Tums for her acid reflux.  She reports nausea and bloating currently with generalized abdominal pain on palpation.          Patient Care Team  Primary Care Provider: Darrian Oreilly MD    Past Medical History:     Allergies   Allergen Reactions   • Penicillins Anaphylaxis   • Lorazepam Mental Status Change and Unknown - High Severity     Suicidal   • Sulfa Antibiotics Nausea Only and Unknown - High Severity     Past Medical History:   Diagnosis Date   • Anxiety    • Brain tumor (benign) (HCC)    • Cervical cancer (HCC)    • GERD (gastroesophageal reflux disease)    • History of chemotherapy    • History of radiation therapy    • Seasonal allergies      Past Surgical History:   Procedure Laterality Date   • BRAIN TUMOR EXCISION     • COLONOSCOPY N/A 7/12/2022    Procedure: COLONOSCOPY WITH POLYPECTOMY/SNARE/BIOPSIES;  Surgeon: Darrian Almanzar MD;  Location: [facility] ENDOSCOPY;  Service: Gastroenterology;  Laterality: N/A;   "COLON POLYP  PROCTITIS     Family History   Problem Relation Age of Onset   • Cancer Mother    • Colon cancer Neg Hx    • Malig Hyperthermia Neg Hx        Home Medications:  Prior to Admission medications    Medication Sig Start Date End Date Taking? Authorizing Provider   multivitamin with minerals tablet tablet Take 1 tablet by mouth Daily.    ProviderEvelia MD   PARoxetine (PAXIL) 20 MG tablet Take 10 mg by mouth Every Night. 1/10/22   ProviderEvelia MD        Social History:   Social History     Tobacco Use   • Smoking status: Every Day     Packs/day: 1.00     Years: 40.00     Pack years: 40.00     Types: Cigarettes   • Smokeless tobacco: Never   Vaping Use   • Vaping Use: Never used   Substance Use Topics   • Alcohol use: Not Currently   • Drug use: Defer         Review of Systems:  Review of Systems   Constitutional: Negative for chills and fever.   HENT: Negative for congestion, ear pain, rhinorrhea and sore throat.    Eyes: Negative for pain.   Respiratory: Negative for cough and shortness of breath.    Cardiovascular: Negative for chest pain.   Gastrointestinal: Positive for abdominal pain and nausea. Negative for diarrhea and vomiting.        Reflux   Genitourinary: Negative for decreased urine volume, dysuria and flank pain.   Musculoskeletal: Positive for back pain. Negative for arthralgias and myalgias.   Skin: Negative for rash.   Neurological: Negative for seizures and headaches.   All other systems reviewed and are negative.       Physical Exam:  BP 94/49   Pulse 69   Temp 98.4 °F (36.9 °C) (Oral)   Resp 20   Ht 165.1 cm (65\")   Wt 62.8 kg (138 lb 7.2 oz)   SpO2 93%   BMI 23.04 kg/m²     Physical Exam  Vitals and nursing note reviewed.   Constitutional:       General: She is not in acute distress.     Appearance: Normal appearance. She is well-developed and normal weight. She is not ill-appearing, toxic-appearing or diaphoretic.   HENT:      Head: Normocephalic and atraumatic.    "   Right Ear: External ear normal.      Left Ear: External ear normal.      Mouth/Throat:      Mouth: Mucous membranes are moist.   Eyes:      General: No scleral icterus.     Conjunctiva/sclera: Conjunctivae normal.      Pupils: Pupils are equal, round, and reactive to light.   Cardiovascular:      Rate and Rhythm: Normal rate and regular rhythm.      Heart sounds: Normal heart sounds.   Pulmonary:      Effort: Pulmonary effort is normal. No respiratory distress.      Breath sounds: Normal breath sounds.   Abdominal:      General: Abdomen is protuberant. Bowel sounds are increased. There is no distension.      Palpations: Abdomen is soft.      Tenderness: There is generalized abdominal tenderness. There is guarding.   Musculoskeletal:         General: No swelling, tenderness, deformity or signs of injury. Normal range of motion.      Cervical back: Normal range of motion and neck supple.   Skin:     General: Skin is warm and dry.      Capillary Refill: Capillary refill takes less than 2 seconds.   Neurological:      General: No focal deficit present.      Mental Status: She is alert and oriented to person, place, and time.   Psychiatric:         Mood and Affect: Mood normal.         Behavior: Behavior normal.                Medications in the Emergency Department:  Medications   sodium chloride 0.9 % flush 10 mL (has no administration in time range)   sodium chloride 0.9 % flush 10 mL (has no administration in time range)   pantoprazole (PROTONIX) injection 40 mg (has no administration in time range)   dicyclomine (BENTYL) capsule 20 mg (has no administration in time range)   aluminum-magnesium hydroxide-simethicone (MAALOX MAX) 400-400-40 MG/5ML suspension 15 mL (15 mL Oral Given 11/17/22 2348)   Lidocaine Viscous HCl (XYLOCAINE) 2 % solution 15 mL (15 mL Mouth/Throat Given 11/17/22 2348)   iopamidol (ISOVUE-370) 76 % injection 100 mL (100 mL Intravenous Given 11/18/22 0051)        Labs  Lab Results (last 24 hours)      Procedure Component Value Units Date/Time    CBC & Differential [[id number]]  (Normal) Collected: 11/17/22 1549    Specimen: Blood Updated: 11/17/22 1627    Narrative:      The following orders were created for panel order CBC & Differential.  Procedure                               Abnormality         Status                     ---------                               -----------         ------                     CBC Auto Differential[[id number]]        Normal              Final result                 Please view results for these tests on the individual orders.    Comprehensive Metabolic Panel [[id number]]  (Abnormal) Collected: 11/17/22 1549    Specimen: Blood Updated: 11/17/22 1617     Glucose 111 mg/dL      BUN 12 mg/dL      Creatinine 0.64 mg/dL      Sodium 138 mmol/L      Potassium 4.2 mmol/L      Chloride 102 mmol/L      CO2 29.0 mmol/L      Calcium 9.8 mg/dL      Total Protein 7.3 g/dL      Albumin 4.30 g/dL      ALT (SGPT) 11 U/L      AST (SGOT) 18 U/L      Alkaline Phosphatase 88 U/L      Total Bilirubin 0.3 mg/dL      Globulin 3.0 gm/dL      A/G Ratio 1.4 g/dL      BUN/Creatinine Ratio 18.8     Anion Gap 7.0 mmol/L      eGFR 97.0 mL/min/1.73      Comment: National Kidney Foundation and American Society of Nephrology (ASN) Task Force recommended calculation based on the Chronic Kidney Disease Epidemiology Collaboration (CKD-EPI) equation refit without adjustment for race.       Narrative:      GFR Normal >60  Chronic Kidney Disease <60  Kidney Failure <15      Lipase [[id number]]  (Normal) Collected: 11/17/22 1549    Specimen: Blood Updated: 11/17/22 1617     Lipase 21 U/L     Lactic Acid, Plasma [[id number]]  (Normal) Collected: 11/17/22 1549    Specimen: Blood Updated: 11/17/22 1613     Lactate 0.8 mmol/L     CBC Auto Differential [[id number]]  (Normal) Collected: 11/17/22 1549    Specimen: Blood Updated: 11/17/22 1627     WBC 6.95 10*3/mm3      RBC 4.29 10*6/mm3      Hemoglobin 13.3 g/dL      Hematocrit  40.0 %      MCV 93.2 fL      MCH 31.0 pg      MCHC 33.3 g/dL      RDW 13.2 %      RDW-SD 44.9 fl      MPV 9.7 fL      Platelets 264 10*3/mm3      Neutrophil % 63.2 %      Lymphocyte % 26.2 %      Monocyte % 8.6 %      Eosinophil % 1.6 %      Basophil % 0.1 %      Immature Grans % 0.3 %      Neutrophils, Absolute 4.39 10*3/mm3      Lymphocytes, Absolute 1.82 10*3/mm3      Monocytes, Absolute 0.60 10*3/mm3      Eosinophils, Absolute 0.11 10*3/mm3      Basophils, Absolute 0.01 10*3/mm3      Immature Grans, Absolute 0.02 10*3/mm3      nRBC 0.0 /100 WBC     Urinalysis With Microscopic If Indicated (No Culture) - Urine, Clean Catch [[id number]]  (Normal) Collected: 11/17/22 1600    Specimen: Urine, Clean Catch Updated: 11/17/22 1614     Color, UA Yellow     Appearance, UA Clear     pH, UA 5.5     Specific Gravity, UA 1.009     Glucose, UA Negative     Ketones, UA Negative     Bilirubin, UA Negative     Blood, UA Negative     Protein, UA Negative     Leuk Esterase, UA Negative     Nitrite, UA Negative     Urobilinogen, UA 0.2 E.U./dL    Narrative:      Urine microscopic not indicated.           Imaging:  CT Abdomen Pelvis With Contrast    Result Date: 11/18/2022  PROCEDURE: CT ABDOMEN PELVIS W CONTRAST  COMPARISON: 9/25/2020.  INDICATIONS: UPPER ABDOMEN PAIN X 2 DAYS.  TECHNIQUE: After obtaining the patient's consent, 572 CT images were created with non-ionic intravenous contrast material.   PROTOCOL:   Standard CT imaging protocol performed.    RADIATION:   Total DLP: 550.7 mGy*cm   Automated exposure control was utilized to minimize radiation dose. CONTRAST: 75 mL Isovue 370 I.V.  FINDINGS: There is a suspected mechanical small bowel obstruction involving the mid small bowel.  A closed loop obstruction is possible as there are thought to be 2 transition zones within the left abdomen.  An internal hernia or volvulus would be among top differential considerations.  Adhesions cannot be excluded.  Strictures related to  "infectious or inflammatory bowel disease are possible.  No pneumoperitoneum or pneumatosis.  No portal or mesenteric venous gas.  No definite focal extraluminal intraperitoneal or retroperitoneal fluid collections are seen to suggest abscess.  There is minimal if any ascites.  The patient has undergone hysterectomy.  The appendix is not clearly identified.  It may be surgically absent.  Extensive atherosclerotic changes are seen without aneurysmal dilatation.  No acute intraperitoneal or retroperitoneal hemorrhage.  There may be atelectasis and/or fibrosis in the lung bases.  Probably no acute infiltrate.  There are gallstones without acute cholecystitis.  No acute pancreatitis.  There is mild nonspecific nodularity of the left adrenal gland.  This finding was seen previously.  No right adrenal nodularity.  There are probably stable bilateral renal cysts.  No definite enhanced CT evidence of nephrolithiasis or ureterolithiasis.  No acute pyelonephritis.  No hydronephrosis.  No definite obstructive uropathy.  No urinary bladder calculi are seen.  There are degenerative changes of the imaged spine and the bilateral sacroiliac joints.         1. A mechanical bowel obstruction is suspected, as detailed above.  No pneumoperitoneum or pneumatosis.   2. New or increased bibasilar pulmonary opacities are seen which are thought more likely to represent atelectasis than atelectasis and/or fibrosis rather than acute infiltrates as with pneumonia.   3. Please see above comments for further detail.     Please note that portions of this note were completed with a voice recognition program.  RICK LOPEZ JR, MD       Electronically Signed and Approved By: RICK LOPEZ JR, MD on 11/18/2022 at 1:42               Procedures:  Procedures    Progress  ED Course as of 11/18/22 0218   Fri Nov 18, 2022   0201 Patient reports that she has had chemo and radiation in the past and that there is a \"kink that looks like an obstruction\" in " her bowel.  She said that they have done a scope before and it was not an obstruction.  I spoke with Dr. Montoya and advised him that she is not having any vomiting, is passing gas, and had a bowel movement this morning.  He said it does not sound like an actual obstruction.  Patient's vitals are stable and her labs were all normal.  She does have generalized tenderness on palpation.  Patient's son also advised that he brought her a cheeseburger and fries at 9:15 PM which she did eat. []      ED Course User Index  [MH] Zoe Mayer APRN                            The patient was initially evaluated in the triage area where orders were placed. The patient was later dispositioned by CATHI Shipley.      The patient was advised to stay for completion of workup which includes but is not limited to communication of labs and radiological results, reassessment and plan. The patient was advised that leaving prior to disposition by a provider could result in critical findings that are not communicated to the patient.     Medical Decision Making:  MDM  Number of Diagnoses or Management Options  Gastroesophageal reflux disease without esophagitis: new and requires workup  Generalized abdominal pain: new and requires workup     Amount and/or Complexity of Data Reviewed  Clinical lab tests: reviewed  Tests in the radiology section of CPT®: ordered and reviewed  Discuss the patient with other providers: yes (Dr. Lindsay Holt)  Independent visualization of images, tracings, or specimens: yes    Risk of Complications, Morbidity, and/or Mortality  Presenting problems: moderate  Diagnostic procedures: moderate  Management options: moderate    Patient Progress  Patient progress: stable           The following orders were placed after triage and evaluation:  Orders Placed This Encounter   Procedures   • CT Abdomen Pelvis With Contrast   • [place] Draw   • Comprehensive Metabolic Panel   • Lipase   • Urinalysis With  Microscopic If Indicated (No Culture) - Urine, Clean Catch   • Lactic Acid, Plasma   • CBC Auto Differential   • NPO Diet NPO Type: Strict NPO   • Undress & Gown   • Surgery (on-call MD unless specified)   • Insert Peripheral IV   • Insert Peripheral IV   • CBC & Differential   • Green Top (Gel)   • Lavender Top   • Gold Top - SST   • Light Blue Top       Final diagnoses:   Generalized abdominal pain   Gastroesophageal reflux disease without esophagitis          Disposition:  ED Disposition     ED Disposition   Discharge    Condition   Stable    Comment   --             This medical record created using voice recognition software.           Zoe Mayer, APRN  11/18/22 021

## 2022-11-18 ENCOUNTER — APPOINTMENT (OUTPATIENT)
Dept: CT IMAGING | Facility: HOSPITAL | Age: 67
End: 2022-11-18

## 2022-11-18 VITALS
WEIGHT: 138.45 LBS | DIASTOLIC BLOOD PRESSURE: 78 MMHG | RESPIRATION RATE: 18 BRPM | TEMPERATURE: 98.4 F | OXYGEN SATURATION: 98 % | SYSTOLIC BLOOD PRESSURE: 120 MMHG | BODY MASS INDEX: 23.07 KG/M2 | HEIGHT: 65 IN | HEART RATE: 68 BPM

## 2022-11-18 PROCEDURE — 0 IOPAMIDOL PER 1 ML: Performed by: EMERGENCY MEDICINE

## 2022-11-18 PROCEDURE — 74177 CT ABD & PELVIS W/CONTRAST: CPT

## 2022-11-18 RX ORDER — CYCLOBENZAPRINE HCL 10 MG
10 TABLET ORAL 3 TIMES DAILY PRN
Qty: 30 TABLET | Refills: 0 | Status: SHIPPED | OUTPATIENT
Start: 2022-11-18 | End: 2023-03-29

## 2022-11-18 RX ORDER — LIDOCAINE 50 MG/G
1 PATCH TOPICAL ONCE
Status: DISCONTINUED | OUTPATIENT
Start: 2022-11-18 | End: 2022-11-18 | Stop reason: HOSPADM

## 2022-11-18 RX ORDER — DICYCLOMINE HCL 20 MG
20 TABLET ORAL EVERY 6 HOURS
Qty: 30 TABLET | Refills: 0 | Status: SHIPPED | OUTPATIENT
Start: 2022-11-18

## 2022-11-18 RX ORDER — LIDOCAINE 50 MG/G
1 PATCH TOPICAL EVERY 24 HOURS
Qty: 15 PATCH | Refills: 0 | Status: SHIPPED | OUTPATIENT
Start: 2022-11-18 | End: 2023-03-29

## 2022-11-18 RX ORDER — PANTOPRAZOLE SODIUM 20 MG/1
20 TABLET, DELAYED RELEASE ORAL DAILY
Qty: 30 TABLET | Refills: 0 | Status: SHIPPED | OUTPATIENT
Start: 2022-11-18 | End: 2023-03-29

## 2022-11-18 RX ORDER — DICYCLOMINE HYDROCHLORIDE 10 MG/1
20 CAPSULE ORAL ONCE
Status: COMPLETED | OUTPATIENT
Start: 2022-11-18 | End: 2022-11-18

## 2022-11-18 RX ADMIN — IOPAMIDOL 100 ML: 755 INJECTION, SOLUTION INTRAVENOUS at 00:51

## 2022-11-18 RX ADMIN — DICYCLOMINE HYDROCHLORIDE 20 MG: 10 CAPSULE ORAL at 02:18

## 2022-11-18 RX ADMIN — LIDOCAINE 1 PATCH: 50 PATCH TOPICAL at 02:54

## 2022-11-18 NOTE — DISCHARGE INSTRUCTIONS
As discussed, if your pain increases or you start having vomiting, please return to the emergency department immediately for further evaluation and treatment.  I have given you contact information for the GI doctor on-call, Dr. Amish Go, as well as the general surgeon, Dr. Montoya, for further evaluation and treatment if needed.

## 2022-12-14 ENCOUNTER — APPOINTMENT (OUTPATIENT)
Dept: MAMMOGRAPHY | Facility: HOSPITAL | Age: 67
End: 2022-12-14

## 2023-10-25 ENCOUNTER — TRANSCRIBE ORDERS (OUTPATIENT)
Dept: ADMINISTRATIVE | Facility: HOSPITAL | Age: 68
End: 2023-10-25
Payer: MEDICARE

## 2023-10-25 DIAGNOSIS — Z78.0 ASYMPTOMATIC MENOPAUSAL STATE: Primary | ICD-10-CM

## 2023-10-25 DIAGNOSIS — Z12.31 VISIT FOR SCREENING MAMMOGRAM: Primary | ICD-10-CM

## 2024-01-10 ENCOUNTER — HOSPITAL ENCOUNTER (EMERGENCY)
Facility: HOSPITAL | Age: 69
Discharge: HOME | End: 2024-01-10
Attending: EMERGENCY MEDICINE
Payer: MEDICARE

## 2024-01-10 ENCOUNTER — APPOINTMENT (OUTPATIENT)
Dept: GENERAL RADIOLOGY | Facility: HOSPITAL | Age: 69
End: 2024-01-10
Payer: MEDICARE

## 2024-01-10 VITALS
SYSTOLIC BLOOD PRESSURE: 152 MMHG | TEMPERATURE: 98.9 F | OXYGEN SATURATION: 95 % | HEART RATE: 77 BPM | BODY MASS INDEX: 23.07 KG/M2 | DIASTOLIC BLOOD PRESSURE: 70 MMHG | HEIGHT: 65 IN | RESPIRATION RATE: 18 BRPM | WEIGHT: 138.45 LBS

## 2024-01-10 DIAGNOSIS — R05.2 SUBACUTE COUGH: ICD-10-CM

## 2024-01-10 DIAGNOSIS — R09.81 SINUS CONGESTION: ICD-10-CM

## 2024-01-10 DIAGNOSIS — H61.23 BILATERAL IMPACTED CERUMEN: ICD-10-CM

## 2024-01-10 DIAGNOSIS — U07.1 COVID-19: Primary | ICD-10-CM

## 2024-01-10 LAB
FLUAV SUBTYP SPEC NAA+PROBE: NOT DETECTED
FLUBV RNA ISLT QL NAA+PROBE: NOT DETECTED
RSV RNA NPH QL NAA+NON-PROBE: NOT DETECTED
SARS-COV-2 RNA RESP QL NAA+PROBE: DETECTED

## 2024-01-10 PROCEDURE — 87637 SARSCOV2&INF A&B&RSV AMP PRB: CPT

## 2024-01-10 PROCEDURE — 99283 EMERGENCY DEPT VISIT LOW MDM: CPT

## 2024-01-10 PROCEDURE — 71045 X-RAY EXAM CHEST 1 VIEW: CPT

## 2024-01-10 RX ORDER — FLUTICASONE PROPIONATE 50 MCG
2 SPRAY, SUSPENSION (ML) NASAL DAILY
Qty: 11.1 ML | Refills: 0 | Status: SHIPPED | OUTPATIENT
Start: 2024-01-10

## 2024-01-10 NOTE — ED PROVIDER NOTES
Time: 10:55 AM EST  Date of encounter:  1/10/2024  Room number: 57/57  Independent Historian/Clinical History and Information was obtained by:   Patient    History is limited by: N/A    Chief Complaint: cough, body aches      History of Present Illness:  Patient is a 68 y.o. year old female who presents to the emergency department for evaluation of cough and body aches. PT advises she had covid about 1.5 months ago and was prescribed steroids and a ZPack, both of which she never retrieved from her pharmacy. Today she presents with similar symptoms and concerns about pneumonia. She is a smoker and alsways has a slight cough but she states her present cough is different. She takes cough medication on a regular basis which is prescribed by her PCP.  Additionally, she states she has a hx of anxiety and is very anxious at this time.     HPI    Patient Care Team  Primary Care Provider: Darrian Oreilly MD    Past Medical History:     Allergies   Allergen Reactions    Azithromycin Other (See Comments)     tonung pilling and swelling     Penicillins Anaphylaxis    Lorazepam Mental Status Change and Unknown - High Severity     Suicidal    Sulfa Antibiotics Nausea Only and Unknown - High Severity     Past Medical History:   Diagnosis Date    Anxiety     Brain tumor (benign)     Cervical cancer     GERD (gastroesophageal reflux disease)     History of chemotherapy     History of radiation therapy     Seasonal allergies      Past Surgical History:   Procedure Laterality Date    BRAIN TUMOR EXCISION      COLONOSCOPY N/A 7/12/2022    Procedure: COLONOSCOPY WITH POLYPECTOMY/SNARE/BIOPSIES;  Surgeon: Darrian Almanzar MD;  Location: [facility] ENDOSCOPY;  Service: Gastroenterology;  Laterality: N/A;  COLON POLYP  PROCTITIS     Family History   Problem Relation Age of Onset    Cancer Mother     Colon cancer Neg Hx     Malig Hyperthermia Neg Hx        Home Medications:  Prior to Admission medications    Medication Sig Start Date End  "Date Taking? Authorizing Provider   Loratadine (Claritin) 10 MG capsule Take  by mouth.    Evelia Castro MD   multivitamin with minerals tablet tablet Take 1 tablet by mouth Daily.    Evelia Castro MD   PARoxetine (PAXIL) 20 MG tablet Take 10 mg by mouth Every Night. 1/10/22   Evelia Castro MD        Social History:   Social History     Tobacco Use    Smoking status: Every Day     Packs/day: 1.00     Years: 40.00     Additional pack years: 0.00     Total pack years: 40.00     Types: Cigarettes    Smokeless tobacco: Never   Vaping Use    Vaping Use: Never used   Substance Use Topics    Alcohol use: Not Currently    Drug use: Defer         Review of Systems:  Review of Systems   Constitutional:  Positive for fatigue. Negative for chills and fever.   HENT:  Positive for ear pain. Negative for congestion and sore throat.    Eyes:  Negative for pain.   Respiratory:  Positive for cough. Negative for chest tightness and shortness of breath.    Cardiovascular:  Negative for chest pain.   Gastrointestinal:  Negative for abdominal pain, diarrhea, nausea and vomiting.   Genitourinary:  Negative for flank pain and hematuria.   Musculoskeletal:  Negative for joint swelling.   Skin:  Negative for pallor.   Neurological:  Negative for seizures and headaches.   All other systems reviewed and are negative.       Physical Exam:  /70 (BP Location: Left arm, Patient Position: Sitting)   Pulse 77   Temp 98.9 °F (37.2 °C) (Oral)   Resp 18   Ht 165.1 cm (65\")   Wt 62.8 kg (138 lb 7.2 oz)   SpO2 95%   BMI 23.04 kg/m²     Physical Exam  Vitals and nursing note reviewed.   Constitutional:       General: She is not in acute distress.     Appearance: Normal appearance. She is not toxic-appearing.   HENT:      Head: Normocephalic and atraumatic.      Mouth/Throat:      Mouth: Mucous membranes are moist.   Eyes:      General: No scleral icterus.  Cardiovascular:      Rate and Rhythm: Normal rate and regular " rhythm.      Pulses: Normal pulses.      Heart sounds: Normal heart sounds.   Pulmonary:      Effort: Pulmonary effort is normal. No respiratory distress.      Breath sounds: No stridor. Rhonchi present. No wheezing or rales.   Chest:      Chest wall: No tenderness.   Abdominal:      General: Abdomen is flat. There is no distension.      Palpations: Abdomen is soft.      Tenderness: There is no abdominal tenderness.   Musculoskeletal:         General: Normal range of motion.      Cervical back: Normal range of motion and neck supple.   Skin:     General: Skin is dry.      Coloration: Skin is not jaundiced or pale.      Findings: No bruising, erythema, lesion or rash.   Neurological:      Mental Status: She is alert and oriented to person, place, and time. Mental status is at baseline.                  Procedures:  Procedures      Medical Decision Making:      Comorbidities that affect care:    Benign brain tumor, COPD, Smoking, cervical cancer, anxiety    External Notes reviewed:          The following orders were placed and all results were independently analyzed by me:  Orders Placed This Encounter   Procedures    COVID PRE-OP / PRE-PROCEDURE SCREENING ORDER (NO ISOLATION) - Swab, Nasopharynx    COVID-19, FLU A/B, RSV PCR 1 HR TAT - Swab, Nasopharynx    XR Chest 1 View       Medications Given in the Emergency Department:  Medications - No data to display     ED Course:    ED Course as of 01/11/24 1351   Wed Salvador 10, 2024   1336 Patient was recently prescribed antibiotics and steroids from a local urgent care center provider however she did not retrieve them from the pharmacy and did not take them.  Additionally her primary care provider prescribes her cough medication regularly for the chronic cough she has from smoking. [MS]      ED Course User Index  [MS] Mary Rodriguez, CATHI       Labs:    Lab Results (last 24 hours)       ** No results found for the last 24 hours. **             Imaging:    No Radiology  Exams Resulted Within Past 24 Hours      Differential Diagnosis and Discussion:    Cough: Differential diagnosis includes but is not limited to pneumonia, acute bronchitis, upper respiratory infection, ACE inhibitor use, allergic reaction, epiglottitis, seasonal allergies, chemical irritants, exercise-induced asthma, viral syndrome.    All labs were reviewed and interpreted by me.  All X-rays impressions were independently interpreted by me.    MDM  Number of Diagnoses or Management Options  Bilateral impacted cerumen: new and does not require workup  COVID-19: new and does not require workup  Sinus congestion: new and does not require workup  Subacute cough: new and does not require workup     Amount and/or Complexity of Data Reviewed  Clinical lab tests: ordered and reviewed  Tests in the radiology section of CPT®: ordered and reviewed  Review and summarize past medical records: yes (I have personally reviewed patient's previous medical encounters.  )    Risk of Complications, Morbidity, and/or Mortality  Presenting problems: low  Diagnostic procedures: low  Management options: low    Patient Progress  Patient progress: stable                 Patient Care Considerations:    ANTIBIOTICS: I considered prescribing antibiotics as an outpatient however no bacterial focus of infection was found.      Consultants/Shared Management Plan:    None    Social Determinants of Health:    Patient is independent, reliable, and has access to care.       Disposition and Care Coordination:    Discharged: The patient is suitable and stable for discharge with no need for consideration of observation or admission.    I have explained the patient´s condition, diagnoses and treatment plan based on the information available to me at this time. I have answered questions and addressed any concerns. The patient has a good  understanding of the patient´s diagnosis, condition, and treatment plan as can be expected at this point. The vital  signs have been stable. The patient´s condition is stable and appropriate for discharge from the emergency department.      The patient will pursue further outpatient evaluation with the primary care physician or other designated or consulting physician as outlined in the discharge instructions. They are agreeable to this plan of care and follow-up instructions have been explained in detail. The patient has received these instructions in written format and have expressed an understanding of the discharge instructions. The patient is aware that any significant change in condition or worsening of symptoms should prompt an immediate return to this or the closest emergency department or call to 911.    Final diagnoses:   COVID-19   Bilateral impacted cerumen   Sinus congestion   Subacute cough        ED Disposition       ED Disposition   Discharge    Condition   Stable    Comment   --               This medical record created using voice recognition software.       Mary Rodriguez, APRN  01/11/24 [id number]

## 2024-01-10 NOTE — DISCHARGE INSTRUCTIONS
Your RSV swab and flu swab were negative.  However you are positive for COVID.  Your chest x-ray was also within normal limits and showed no indications of pneumonia.  Please continue to take the cough medication that your primary care provider has prescribed you.  Please also retrieve the steroids that you were previously prescribed from your pharmacy and take them as directed.  The steroid should also help with the fluid behind your eardrums.  You also have an excessive amount of earwax in your ears.  Please purchase some over-the-counter earwax removal drops and use as directed.

## 2024-06-26 ENCOUNTER — LAB (OUTPATIENT)
Dept: LAB | Facility: HOSPITAL | Age: 69
End: 2024-06-26
Payer: MEDICARE

## 2024-06-26 ENCOUNTER — OFFICE VISIT (OUTPATIENT)
Dept: GASTROENTEROLOGY | Facility: CLINIC | Age: 69
End: 2024-06-26
Payer: MEDICARE

## 2024-06-26 VITALS
SYSTOLIC BLOOD PRESSURE: 123 MMHG | HEART RATE: 76 BPM | DIASTOLIC BLOOD PRESSURE: 58 MMHG | WEIGHT: 139.8 LBS | BODY MASS INDEX: 23.29 KG/M2 | HEIGHT: 65 IN

## 2024-06-26 DIAGNOSIS — K62.7 RADIATION PROCTITIS: ICD-10-CM

## 2024-06-26 DIAGNOSIS — Z86.010 HISTORY OF COLON POLYPS: ICD-10-CM

## 2024-06-26 DIAGNOSIS — R19.7 DIARRHEA, UNSPECIFIED TYPE: Primary | ICD-10-CM

## 2024-06-26 DIAGNOSIS — R19.7 DIARRHEA, UNSPECIFIED TYPE: ICD-10-CM

## 2024-06-26 LAB
027 TOXIN: NORMAL
C DIFF TOX GENS STL QL NAA+PROBE: NEGATIVE
HEMOCCULT STL QL IA: NEGATIVE
LACTOFERRIN STL QL LA: NEGATIVE

## 2024-06-26 PROCEDURE — 87493 C DIFF AMPLIFIED PROBE: CPT

## 2024-06-26 PROCEDURE — 87506 IADNA-DNA/RNA PROBE TQ 6-11: CPT

## 2024-06-26 PROCEDURE — 83630 LACTOFERRIN FECAL (QUAL): CPT

## 2024-06-26 PROCEDURE — 82274 ASSAY TEST FOR BLOOD FECAL: CPT

## 2024-06-26 RX ORDER — POLYETHYLENE GLYCOL 3350, SODIUM SULFATE, SODIUM CHLORIDE, POTASSIUM CHLORIDE, ASCORBIC ACID, SODIUM ASCORBATE 140-9-5.2G
1 KIT ORAL TAKE AS DIRECTED
Qty: 1 EACH | Refills: 0 | Status: ON HOLD | OUTPATIENT
Start: 2024-06-26 | End: 2024-06-28

## 2024-06-26 RX ORDER — IBUPROFEN 800 MG/1
1 TABLET ORAL 3 TIMES DAILY
COMMUNITY
Start: 2024-05-14 | End: 2024-06-28 | Stop reason: HOSPADM

## 2024-06-26 RX ORDER — MONTELUKAST SODIUM 4 MG/1
1 TABLET, CHEWABLE ORAL
Qty: 30 TABLET | Refills: 3 | Status: SHIPPED | OUTPATIENT
Start: 2024-06-26 | End: 2024-06-27

## 2024-06-26 NOTE — PROGRESS NOTES
Patient Name: Ivory Nava   Visit Date: 06/26/2024   Patient ID: [id number]  Provider: CATHI Cox    Sex: female  Location:  Location Address:  Location Phone: [phone]  [place] [address] [phone]    YOB: 1955  Age: 68 y.o.   Primary Care Provider Darrian Oreilly MD      Referring Provider: No ref. provider found        Chief Complaint  Diarrhea (Pt states having 4-5 small Bms in the mornings, will have 1 large liquid BM after the small Bms with occ incontinence ) and Abdominal Pain (Upper ABD pain some days )    History of Present Illness    Patient has not been seen since her initial visit in March 2022-she reported diarrhea since 2008 after having chemo and radiation for cervical cancer.  Occasionally had normal stools as well, also reported losing 20 pounds over 4 years.  Stool studies were ordered but not completed, celiac ordered but not completed    Colonoscopy 7/12/2022: Good prep, erosions and erythema noted in the rectum felt related to radiation proctitis, random colon biopsies were negative, small polyp in the cecum-sessile serrated lesion, rectal biopsy negative  Recall 5 years    Pt states she is still struggling with diarrhea, states she is depressed over this. Feels she cannot go anywhere, fears FI, has some fecal seepage daily and has had full FI with urge at times.  Pt has small formed stools in the morning, and later in the days she has more watery diarrhea, averages 5x day, OTC anti-diarrheal pills help, she may not have BM x 1 day after 1 pill. Pt takes advil PRN  No blood  seen in stool.   No unint wt loss.   Eating regularly, but has diarrhea after.   Past Medical History:   Diagnosis Date    Anxiety     Brain tumor (benign)     Cervical cancer     GERD (gastroesophageal reflux disease)     History of chemotherapy     History of radiation therapy     Seasonal allergies        Past Surgical History:   Procedure Laterality Date    BRAIN TUMOR  "EXCISION      COLONOSCOPY N/A 7/12/2022    Procedure: COLONOSCOPY WITH POLYPECTOMY/SNARE/BIOPSIES;  Surgeon: Darrian Almanzar MD;  Location: [facility] ENDOSCOPY;  Service: Gastroenterology;  Laterality: N/A;  COLON POLYP  PROCTITIS       Allergies   Allergen Reactions    Azithromycin Other (See Comments)     tonung pilling and swelling     Penicillins Anaphylaxis    Lorazepam Mental Status Change and Unknown - High Severity     Suicidal    Sulfa Antibiotics Nausea Only and Unknown - High Severity       Family History   Problem Relation Age of Onset    Cancer Mother     Colon cancer Neg Hx     Malig Hyperthermia Neg Hx         Social History     Tobacco Use    Smoking status: Every Day     Current packs/day: 1.00     Average packs/day: 1 pack/day for 40.0 years (40.0 ttl pk-yrs)     Types: Cigarettes    Smokeless tobacco: Never   Vaping Use    Vaping status: Never Used   Substance Use Topics    Alcohol use: Not Currently    Drug use: Defer       Objective     Vital Signs:   /58 (BP Location: Left arm, Patient Position: Sitting, Cuff Size: Adult)   Pulse 76   Ht 165.1 cm (65\")   Wt 63.4 kg (139 lb 12.8 oz)   BMI 23.26 kg/m²       Physical Exam  Constitutional:       General: The patient is not in acute distress.     Appearance: Normal appearance.   HENT:      Head: Normocephalic and atraumatic.      Nose: Nose normal.   Pulmonary:      Effort: Pulmonary effort is normal. No respiratory distress.   Abdominal:      General: Abdomen is flat.      Palpations: Abdomen is soft. There is no mass.      Tenderness: There is no abdominal tenderness. There is no guarding.   Musculoskeletal:      Cervical back: Neck supple.      Right lower leg: No edema.      Left lower leg: No edema.   Skin:     General: Skin is warm and dry.   Neurological:      General: No focal deficit present.      Mental Status: The patient is alert and oriented to person, place, and time.      Gait: Gait normal.   Psychiatric:         Mood and " Affect: Mood normal.         Speech: Speech normal.         Behavior: Behavior normal.         Thought Content: Thought content normal.     Result Review :   The following data was reviewed by: CATHI Cox on 06/26/2024:                      Assessment and Plan    Diagnoses and all orders for this visit:    1. Diarrhea, unspecified type (Primary)  -     Enteric Bacterial Panel - Stool, Per Rectum; Future  -     Enteric Parasite Panel - Stool, Per Rectum; Future  -     Fecal Lactoferrin Qual. - Stool, Per Rectum; Future  -     Clostridioides difficile Toxin - Stool, Per Rectum; Future  -     Occult Blood, Fecal By Immunoassay - Stool, Per Rectum; Future  -     Case Request; Standing  -     Case Request    2. Radiation proctitis  -     Case Request; Standing  -     Case Request    3. History of colon polyps    Other orders  -     colestipol (Colestid) 1 g tablet; Take 1 tablet by mouth every night at bedtime.  Dispense: 30 tablet; Refill: 3  -     Follow Anesthesia Guidelines / Protocol; Standing  -     Follow Anesthesia Guidelines / Protocol; Future  -     Obtain Informed Consent; Future  -     Verify NPO; Standing  -     Verify Bowel Prep Was Successful; Standing  -     Give Tap Water Enema If Bowel Prep Insufficient; Standing  -     Obtain Informed Consent; Standing  -     PEG-KCl-NaCl-NaSulf-Na Asc-C (Plenvu) 140 g reconstituted solution solution; Take 140 g by mouth Take As Directed for 1 day. Take per office instructions  Dispense: 1 each; Refill: 0              Follow Up   Return for follow up after procedure.  Stool studies  Flex-sig Surgical Risk and Benefits: Possible risks/complications, benefits, and alternatives to surgical or invasive procedure have been explained to patient and/or legal guardian; risks include bleeding, infection, and perforation. Patient has been evaluated and can tolerate anesthesia and/or sedation. Risks, benefits, and alternatives to anesthesia and sedation have  been explained to patient and/or legal guardian.   Once stools are collected, pt may start Colestid -- call if any issues    Patient was given instructions and counseling regarding her condition or for health maintenance advice. Please see specific information pulled into the AVS if appropriate.

## 2024-06-27 ENCOUNTER — ANESTHESIA EVENT (OUTPATIENT)
Dept: GASTROENTEROLOGY | Facility: HOSPITAL | Age: 69
End: 2024-06-27
Payer: MEDICARE

## 2024-06-27 ENCOUNTER — TELEPHONE (OUTPATIENT)
Dept: GASTROENTEROLOGY | Facility: CLINIC | Age: 69
End: 2024-06-27
Payer: MEDICARE

## 2024-06-27 LAB
C COLI+JEJ+UPSA DNA STL QL NAA+NON-PROBE: NOT DETECTED
CRYPTOSP DNA STL QL NAA+NON-PROBE: NOT DETECTED
E HISTOLYT DNA STL QL NAA+NON-PROBE: NOT DETECTED
EC STX1+STX2 GENES STL QL NAA+NON-PROBE: NOT DETECTED
G LAMBLIA DNA STL QL NAA+NON-PROBE: NOT DETECTED
S ENT+BONG DNA STL QL NAA+NON-PROBE: NOT DETECTED
SHIGELLA SP+EIEC IPAH ST NAA+NON-PROBE: NOT DETECTED

## 2024-06-27 RX ORDER — COLESTIPOL HYDROCHLORIDE 5 G/5G
GRANULE, FOR SUSPENSION ORAL
Qty: 30 EACH | Refills: 3 | Status: SHIPPED | OUTPATIENT
Start: 2024-06-27

## 2024-06-27 NOTE — TELEPHONE ENCOUNTER
Patient called in stating that Brittani VILLEGAS put in a prescription for Colestipol (Colestid) 1 g tablet. When patient picked up the pill is to big for her to swallow. Patient ask if she could cut pill in half and take. Please call patient at [phone].

## 2024-06-27 NOTE — ANESTHESIA PREPROCEDURE EVALUATION
Anesthesia Evaluation     Patient summary reviewed and Nursing notes reviewed   NPO Solid Status: > 8 hours  NPO Liquid Status: > 2 hours           Airway   Mallampati: I  TM distance: >3 FB  Neck ROM: full  No difficulty expected  Dental    (+) edentulous    Pulmonary - negative pulmonary ROS and normal exam    breath sounds clear to auscultation  (+) a smoker Current, Abstained day of surgery, cigarettes,  Cardiovascular - negative cardio ROS and normal exam  Exercise tolerance: good (4-7 METS)    Rhythm: regular  Rate: normal        Neuro/Psych  (+) psychiatric history Anxiety  GI/Hepatic/Renal/Endo    (+) GERD well controlled    Musculoskeletal (-) negative ROS    Abdominal    Substance History - negative use     OB/GYN negative ob/gyn ROS         Other      history of cancer    ROS/Med Hx Other: No EKG on file     Hx radiation proctitis    Patient blind in right eye                Anesthesia Plan    ASA 2     general   total IV anesthesia  (Total IV Anesthesia    Patient understands anesthesia not responsible for dental damage.  )  intravenous induction     Anesthetic plan, risks, benefits, and alternatives have been provided, discussed and informed consent has been obtained with: patient.  Pre-procedure education provided  Plan discussed with CRNA.      CODE STATUS:

## 2024-06-28 ENCOUNTER — HOSPITAL ENCOUNTER (OUTPATIENT)
Facility: HOSPITAL | Age: 69
Setting detail: HOSPITAL OUTPATIENT SURGERY
Discharge: HOME | End: 2024-06-28
Attending: INTERNAL MEDICINE | Admitting: INTERNAL MEDICINE
Payer: MEDICARE

## 2024-06-28 ENCOUNTER — ANESTHESIA (OUTPATIENT)
Dept: GASTROENTEROLOGY | Facility: HOSPITAL | Age: 69
End: 2024-06-28
Payer: MEDICARE

## 2024-06-28 VITALS
DIASTOLIC BLOOD PRESSURE: 65 MMHG | HEART RATE: 62 BPM | TEMPERATURE: 95.3 F | BODY MASS INDEX: 21.5 KG/M2 | RESPIRATION RATE: 20 BRPM | OXYGEN SATURATION: 98 % | SYSTOLIC BLOOD PRESSURE: 131 MMHG | WEIGHT: 129.19 LBS

## 2024-06-28 DIAGNOSIS — R19.7 DIARRHEA, UNSPECIFIED TYPE: ICD-10-CM

## 2024-06-28 DIAGNOSIS — K62.7 RADIATION PROCTITIS: ICD-10-CM

## 2024-06-28 PROCEDURE — 88305 TISSUE EXAM BY PATHOLOGIST: CPT | Performed by: INTERNAL MEDICINE

## 2024-06-28 PROCEDURE — 25810000003 LACTATED RINGERS PER 1000 ML

## 2024-06-28 PROCEDURE — 25010000002 PROPOFOL 10 MG/ML EMULSION

## 2024-06-28 RX ORDER — PROPOFOL 10 MG/ML
VIAL (ML) INTRAVENOUS AS NEEDED
Status: DISCONTINUED | OUTPATIENT
Start: 2024-06-28 | End: 2024-06-28 | Stop reason: SURG

## 2024-06-28 RX ORDER — LIDOCAINE HYDROCHLORIDE 20 MG/ML
INJECTION, SOLUTION EPIDURAL; INFILTRATION; INTRACAUDAL; PERINEURAL AS NEEDED
Status: DISCONTINUED | OUTPATIENT
Start: 2024-06-28 | End: 2024-06-28 | Stop reason: SURG

## 2024-06-28 RX ORDER — SODIUM CHLORIDE, SODIUM LACTATE, POTASSIUM CHLORIDE, CALCIUM CHLORIDE 600; 310; 30; 20 MG/100ML; MG/100ML; MG/100ML; MG/100ML
30 INJECTION, SOLUTION INTRAVENOUS CONTINUOUS
Status: DISCONTINUED | OUTPATIENT
Start: 2024-06-28 | End: 2024-06-28 | Stop reason: HOSPADM

## 2024-06-28 RX ADMIN — LIDOCAINE HYDROCHLORIDE 50 MG: 20 INJECTION, SOLUTION INTRAVENOUS at 11:10

## 2024-06-28 RX ADMIN — SODIUM CHLORIDE, POTASSIUM CHLORIDE, SODIUM LACTATE AND CALCIUM CHLORIDE 30 ML/HR: 600; 310; 30; 20 INJECTION, SOLUTION INTRAVENOUS at 10:52

## 2024-06-28 RX ADMIN — PROPOFOL 60 MG: 10 INJECTION, EMULSION INTRAVENOUS at 11:10

## 2024-06-28 RX ADMIN — PROPOFOL 150 MCG/KG/MIN: 10 INJECTION, EMULSION INTRAVENOUS at 11:10

## 2024-06-28 NOTE — ANESTHESIA POSTPROCEDURE EVALUATION
Patient: Ivory Nava    Procedure Summary       Date: 06/28/24 Room / Location: [facility] ENDOSCOPY 4 / [facility] ENDOSCOPY    Anesthesia Start: 1107 Anesthesia Stop: 1134    Procedure: COLONOSCOPY WITH BIOPSIES AND POLYPECTOMY (Anus) Diagnosis:       Diarrhea, unspecified type      Radiation proctitis      (Diarrhea, unspecified type [R19.7])      (Radiation proctitis [K62.7])    Surgeons: Darrian Almanzar MD Provider: Marilee Conroy CRNA    Anesthesia Type: general ASA Status: 2            Anesthesia Type: general    Vitals  Vitals Value Taken Time   /65 06/28/24 1148   Temp 35.2 °C (95.3 °F) 06/28/24 1147   Pulse 58 06/28/24 1148   Resp 20 06/28/24 1147   SpO2 97 % 06/28/24 1148   Vitals shown include unfiled device data.        Post Anesthesia Care and Evaluation    Post-procedure mental status: acceptable.  Pain management: satisfactory to patient    Airway patency: patent  Anesthetic complications: No anesthetic complications    Cardiovascular status: acceptable  Respiratory status: acceptable    Comments: Per chart review

## 2024-06-28 NOTE — H&P
Pre Procedure History & Physical    Chief Complaint:   Chronic diarrhea    Subjective     HPI:   Chronic diarrhea    Past Medical History:   Past Medical History:   Diagnosis Date    Anxiety     Brain tumor (benign)     Cervical cancer     GERD (gastroesophageal reflux disease)     History of chemotherapy     History of radiation therapy     Seasonal allergies        Past Surgical History:  Past Surgical History:   Procedure Laterality Date    BRAIN TUMOR EXCISION      COLONOSCOPY N/A 7/12/2022    Procedure: COLONOSCOPY WITH POLYPECTOMY/SNARE/BIOPSIES;  Surgeon: Darrian Almanzar MD;  Location: [facility] ENDOSCOPY;  Service: Gastroenterology;  Laterality: N/A;  COLON POLYP  PROCTITIS       Family History:  Family History   Problem Relation Age of Onset    Cancer Mother     Colon cancer Neg Hx     Malig Hyperthermia Neg Hx        Social History:   reports that she has been smoking cigarettes. She has a 40 pack-year smoking history. She has never used smokeless tobacco. She reports that she does not currently use alcohol. Drug use questions deferred to the physician.    Medications:   Medications Prior to Admission   Medication Sig Dispense Refill Last Dose    fluticasone (FLONASE) 50 MCG/ACT nasal spray 2 sprays into the nostril(s) as directed by provider Daily. 11.1 mL 0 Past Week    ibuprofen (ADVIL,MOTRIN) 800 MG tablet Take 1 tablet by mouth 3 times a day.   Past Month    Loratadine (Claritin) 10 MG capsule Take  by mouth.   Past Week    multivitamin with minerals tablet tablet Take 1 tablet by mouth Daily.   Past Week    PARoxetine (PAXIL) 20 MG tablet Take 0.5 tablets by mouth Every Night.   6/27/2024    colestipol (COLESTID) 5 g packet Take half packet every night at bedtime.  Hold if no bowel movement for 24 hours 30 each 3 Unknown       Allergies:  Azithromycin, Penicillins, Lorazepam, and Sulfa antibiotics        Objective     Blood pressure 132/66, pulse 70, temperature 98.8 °F (37.1 °C), temperature  source Temporal, resp. rate 18, weight 58.6 kg (129 lb 3 oz), SpO2 93%.    Physical Exam   Constitutional: Pt is oriented to person, place, and time and well-developed, well-nourished, and in no distress.   Mouth/Throat: Oropharynx is clear and moist.   Neck: Normal range of motion.   Cardiovascular: Normal rate, regular rhythm and normal heart sounds.    Pulmonary/Chest: Effort normal and breath sounds normal.   Abdominal: Soft. Nontender  Skin: Skin is warm and dry.   Psychiatric: Mood, memory, affect and judgment normal.     Assessment & Plan     Diagnosis:  Chronic diarrhea    Anticipated Surgical Procedure:  Flexible sigmoidoscopies    The risks, benefits, and alternatives of this procedure have been discussed with the patient or the responsible party- the patient understands and agrees to proceed.

## 2024-07-01 ENCOUNTER — TELEPHONE (OUTPATIENT)
Dept: GASTROENTEROLOGY | Facility: CLINIC | Age: 69
End: 2024-07-01
Payer: MEDICARE

## 2024-07-01 NOTE — TELEPHONE ENCOUNTER
DELETE AFTER REVIEWING: Send this encounter to the appropriate pool. See your Call Action Grid or Workflows for direction.    Caller: Ivory Nava    Relationship: Self    Best call back number: [phone]    Which medication are you concerned about: HANNAH    Who prescribed you this medication: SYDNEE VALDES    When did you start taking this medication: HAS NOT STARTED    What are your concerns: PILL IS TOO BIG JUST WANTS TO CONFIRM PER DR SPAULDING IT IS OK TO CRUSH THE PILL SO SHE CAN SWALLOW PLEASE TRY TO CALL BEFORE 10:30 PATIENT HAS TO LEAVE TO GO TO WORK  ALSO WANTS TO CONFIRM IT IS OK TO TAKE HER PAXIL 1 HOUR PRIOR TO THE MEDICAITON

## 2024-07-02 ENCOUNTER — TELEPHONE (OUTPATIENT)
Dept: GASTROENTEROLOGY | Facility: CLINIC | Age: 69
End: 2024-07-02
Payer: MEDICARE

## 2024-07-02 NOTE — TELEPHONE ENCOUNTER
----- Message from Brittani Oseguera sent at 7/1/2024  8:40 PM EDT -----  - One 5 mm polyp in the ascending colon, removed with a cold snare--HP / benign   - The entire examined colon is normal. Biopsied. -- random bx negative  - Diverticulosis in the sigmoid colon.  -Rectal bx negative     Repeat in 5 yrs  Keep f/u

## 2024-07-02 NOTE — TELEPHONE ENCOUNTER
Went over results and recommendations with pt, pt verbalized understanding.     Recall Entered   Updated  Recall letter mailed

## 2024-07-02 NOTE — TELEPHONE ENCOUNTER
Called pt --- pt has actually already called and asked this questions --- I had reviewed with MONIQUE. Previously, Dr. Almanzar advised pt could crush the medication. I reminded pt of this. But, I did also let pt know that MONIQUE rx'd the granules that may be easier for her to swallow. Advised pt to review with her pharmacist the question about her Paxil. Pt agreeable to this recommendation.

## 2024-07-02 NOTE — PROGRESS NOTES
- One 5 mm polyp in the ascending colon, removed with a cold snare--HP / benign   - The entire examined colon is normal. Biopsied. -- random bx negative  - Diverticulosis in the sigmoid colon.  -Rectal bx negative     Repeat in 5 yrs  Keep f/u

## 2024-07-03 ENCOUNTER — TELEPHONE (OUTPATIENT)
Dept: GASTROENTEROLOGY | Facility: CLINIC | Age: 69
End: 2024-07-03
Payer: MEDICARE

## 2024-07-03 RX ORDER — COLESTIPOL HYDROCHLORIDE 5 G/5G
GRANULE, FOR SUSPENSION ORAL
Qty: 30 EACH | Refills: 3 | Status: SHIPPED | OUTPATIENT
Start: 2024-07-03 | End: 2024-07-05

## 2024-07-03 NOTE — TELEPHONE ENCOUNTER
Called [facility] and informed them of the message we received, and asked what needed to be changed with current Rx when resent. Pharmacist stated we didn't need to resend anything, they just do not have this medication and neither will any other local [facility].     Called patient and she requested Colestid to be sent to [facility] in [place], I have added this to her pharmacy list.    Will call patient to notify once sent.

## 2024-07-03 NOTE — TELEPHONE ENCOUNTER
I sent the Colestid powder packets to [facility] 6/27/2024  As long as patient is taking her Paxil at least 1 hour before Colestid it should not be a problem

## 2024-07-03 NOTE — TELEPHONE ENCOUNTER
I called and spoke with patient. She is aware to check with pharmacy on colestipol packets. The rx was sent to pharmacy on 06/27/24. Pt aware of Paxil instructions.    [facility]  Certificate of Child Health Examination  Student's Name:   Yaz Jason Birth Date  2021  Sex  female  Race/Ethnicity   School/Grade Level/ID#     Address:   [address]  [facility] [address]  Parent/Guardian             Telephone#                                            Home:                               Work:   IMMUNIZATIONS: To be completed by health care provider. The mo/da/yr for every dose administered is required. If a specific vaccine is medically contraindicated, a separate written statement must be attached by the health care responsible for completing the health examination explaining the medical reason for the contraindication.      Immunization History   Administered Date(s) Administered   • DTaP(INFANRIX) 12/01/2021   • DTaP/HIB/IPV 2020   • DTaP/Hep B/IPV 2020, 2020   • Hep A, ped/adol, 2 dose 03/23/2021   • Hep B, adolescent or pediatric 2020, 2020   • Hib (PRP-OMP) 2020, 2020, 12/01/2021   • Influenza, injectable, quadrivalent, preservative-free 2020, 2020, 12/01/2021   • MMR 03/23/2021   • Pneumococcal Conjugate 13 valent 2020, 2020, 2020, 12/01/2021   • Rotavirus - monovalent 2020, 2020   • Rotavirus - pentavalent 2020   • Varicella 03/23/2021            Health care provider (MD, DO, APN, PA, school health professional, health official) verifying above immunization history must sign below. If adding dates to the above immunization history section, put your initials by date(s) and sign here.)  Signature                                                                   Title                                                Date 12/1/21  _____________________________________________________________________________________  Signature                                                                 Title                                                 Date  _____________________________________________________________________________________   ALTERNATIVE PROOF OF IMMUNITY   1. Clinical diagnosis (measles, mumps, hepatitis B) is allowed when verified by physician and supported with lab confirmation.  Attach copy of lab result.    * MEASLES (Rubeola)  MO   DA  YR          **MUMPS  MO   DA  YR             HEPATITIS B  MO   DA   YR           VARICELLA  MO   DA  YR      2. History of varicella (chickenpox) disease is acceptable if verified by health care provider, school health professional or health official.  Person signing below verifies that the parent/guardian’s description of varicella disease history is indicative of past infection and is accepting such history as documentation of disease.  Date of Disease                                  Signature                                                           Title   3. Laboratory Evidence of Immunity (check one)      __ Measles*         __ Mumps**        __ Rubella        __Varicella    (Attach copy of lab result)         *All measles cases diagnosed on or after July 1, 2002, must be confirmed by laboratory evidence.      **All mumps cases diagnosed on or after July 1, 2013, must be confirmed by laboratory evidence.     Completion of Alternative 1 or 3 MUST be accompanied by Labs & Physician Signature: ___________________________  Physician Statements of Immunity MUST be submitted to [facility] for review.     Certificates of Hindu Exemption to Immunizations or Physician Medical Statements of Medical Contraindication Are Reviewed   and Maintained by the School Authority     (11/2015)                                         (COMPLETE BOTH SIDES)                                  Approved [facility] 3/2016      Student's Name:  Yaz Jason Birth Date 2021 Sex female School Grade Level/ID#     Page 2 of 3   HEALTH HISTORY      TO BE COMPLETED AND SIGNED BY PARENT/GUARDIAN AND VERIFIED BY HEALTH CARE  PROVIDER  ALLERGIES: (Food, drug, insect, other) No has No Known Allergies.   MEDICATION: (List all prescribed or taken on a regular basis.)   No   Diagnosis of asthma?  Child wakes during night coughing? Yes    No  Yes    No  Loss of function of one of paired  organs?(eye/ear/kidney/testicle) Yes    No    Birth defects? Yes    No  Hospitalizations? Yes    No    Developmental delay? Yes    No   When? What for? Yes    No    Blood disorders? Hemophilia,  Sickle Cell, Other? Explain. Yes    No  Surgery? (List all.)  When? What for? Yes    No    Diabetes? Yes    No  Serious injury or illness? Yes    No    Head injury/Concussion/Passed out? Yes    No  TB skin test positive (past/present)? * Yes    No *If yes, refer to local [facility] dept   Seizures? What are they like?  Yes    No  TB disease (past or present)? * Yes    No *If yes, refer to local [facility] dept   Heart problem/Shortness of breath?  Yes    No  Tobacco use (type, frequency)?  Yes    No    Heart murmur/High blood pressure? Yes    No  Alcohol/Drug use?  Yes    No    Dizziness or chest pain with exercise? Yes    No  Family history of sudden death  before age 50? (Cause?) Yes    No    Eye/Vision problems? ______           __Glasses          __Contacts  Last exam by eye doctor ______  Other concerns? (crossed eye, drooping lids, squinting, difficulty reading) Dental?   __ Braces     __ Bridge     __ Plate   __Other   Ear/Hearing problems? Yes    No  Information may be shared with appropriate personnel for health and educational purposes.   Bone/Joint problem/injury/scoliosis? Yes    No  Parent/Guardian  Signature                                               Date   PHYSICAL EXAMINATION REQUIREMENTS   Entire section below to be completed by MD/DO/APN/PA Head Circumference if <2-3 years old - Temp 98.6 °F (37 °C) (Temporal)   Ht 35.24\" (89.5 cm)   Wt 12.4 kg (27 lb 5.4 oz)   HC 46.6 cm (18.35\")   BMI 15.48 kg/m²   BSA 0.54 m²    48 %ile (Z= -0.06) based on WHO  (Girls, 0-2 years) BMI-for-age based on BMI available as of 12/1/2021.   DIABETES SCREENING (NOT REQUIRED FOR ) BMI>85% age/sex No     And any two of the following: Family History: No  Ethnic Minority: No    Signs of Insulin Resistance (hypertension, dyslipidemia, polycystic ovarian syndrome, acanthosis nigricans): No  At Risk: No   LEAD RISK QUESTIONNAIRE Required for children age 6 months through 6 years enrolled in licensed or public school operated day care, , nursery school and/or . (Blood test required if resides in [place] or high risk zip code.)  Questionnaire Administered? Yes  Blood Test Indicated? No   Blood Test Date _____   Blood Test Result ______________   TB SKIN OR BLOOD TEST Recommended only for children in high-risk groups including children immunosuppressed due to HIV infection or other conditions, frequent travel to or born in high prevalence countries or those exposed to adults in high-risk categories. See CDC guidelines. http://www.cdc.gov/tb/publications/factsheets/testing/TB_testing.htm.  Test Needed: No     Test performed: No                          Skin Test:    Date Read              /      /             Result:   Positive__      Negative__        mm________                          Blood Test: Date Reported       /      /               Result:  Positive__      Negative__      Value________  LAB TESTS (recommended) Date/Result  Date/Results   Hemoglobin or Hematocrit NA Sickle Cell (when indicated) NA   Urinalysis NA Developmental Screening Tool NA        SYSTEM REVIEW Normal  Comments/Follow-up/Needs  Normal Comments/Follow-up/Needs   Skin  Yes  Endocrine Yes    Ears Yes                  Screening Result Gastrointestinal Yes    Eyes Yes                  Screening Result: Genito-Urinary Yes                                            LMP   Nose Yes  Neurologic Yes    Throat Yes  Musculoskeletal Yes    Mouth/Dental Yes  Spinal Exam Yes    Cardiovascular/HTN Yes   Nutritional status Yes    Respiratory Yes Diagnosis of Asthma: No   Mental Health Yes    Currently Prescribed Asthma Medication:        No  Quick-relief medication (e.g. Short Acting Beta Antagonist)        No   Controller medication (e.g. inhaled corticosteroid) Other     NEEDS/MODIFICATIONS required in the school setting: No restrictions DIETARY Needs/Restrictions: No restrictions   SPECIAL INSTRUCTIONS/DEVICES e.g. safety glasses, glass eye, chest protector for arrhythmia, pacemaker, prosthetic device, dental bridge, false teeth, athletic support/cup: No restrictions   MENTAL HEALTH/OTHER Is there anything else the school should know about this student?  If you would like to discuss this student’s health with school or school health personnel:  Not needed   EMERGENCY ACTION needed while at school due to child’s health condition (e.g. ,seizures, asthma, insect sting, food, peanut allergy, bleeding problem, diabetes, heart problem)?  Febrile seizure precautions, give Tyelnol for any fever and call parent   On the basis of the examination on this day, I approve this child’s participation in                                (If No or Modified please attach explanation.)  PHYSICAL EDUCATION:  Yes                               [employer]   Yes   Print Name  Zuleika Miller MD         Signature                                                                         Date: 12/1/2021   Address:  [facility] [address]  [address] [place] [id number] [phone]         (Complete Both Sides)     [facility]

## 2024-07-03 NOTE — TELEPHONE ENCOUNTER
"Patient presented to the office. Pt is requesting the Colestipol granules be sent to her pharmacy because she is unable to crush or swallow the Colestipol pills. Pt uses [facility]. Pt states she talked with her pharmacy, and the pharmacist stated the Colestipol being taken at 10 PM may \"take away\" from the Paxil that she takes at 9 PM. Patient is wondering if she can take at a different time. Please advise.   "

## 2024-07-03 NOTE — TELEPHONE ENCOUNTER
Caller: PHARMACY    Relationship:     Best call back number:     Requested Prescriptions: colestipol (COLESTID) 5 g packet     Pharmacy where request should be sent:    [id number] - [place], [place] - [address] AT [facility] OF [address] & [place] - [phone]  - [phone] FX  [address] [place] [place] [address]  Phone: [phone]  Fax: [phone]     Last office visit with prescribing clinician: 6/26/2024   Last telemedicine visit with prescribing clinician: Visit date not found   Next office visit with prescribing clinician: Visit date not found     Additional details provided by patient: PHARMACY STATED THAT THEY NEED A NEW PRESCRIPTION FOR THIS RX    Does the patient have less than a 3 day supply:  [x] Yes  [] No    Would you like a call back once the refill request has been completed: [x] Yes [] No    If the office needs to give you a call back, can they leave a voicemail: [x] Yes [] No    Johana Mcintosh   07/03/24 14:02 EDT

## 2024-07-05 RX ORDER — COLESTIPOL HYDROCHLORIDE 5 G/5G
GRANULE, FOR SUSPENSION ORAL
Qty: 30 EACH | Refills: 3 | Status: SHIPPED | OUTPATIENT
Start: 2024-07-05

## 2024-07-05 NOTE — TELEPHONE ENCOUNTER
Caller: Ivory Nava    Relationship to patient: Self    Best call back number: [phone]    Patient is needing: PT CALLED AND SAID SHE HAD CALLED MULTIPLE PHARMACIES NEAR HER AND NONE OF THEM CARRIED THIS MEDICATION. SHE FINALLY GOT A HOLD OF ONE THAT DID THOUGH AND NEEDS IT SENT THERE,   [facility]  [id number] [phone]  PLEASE CALL BACK WHEN IT'S BEEN SENT THERE.

## 2024-07-10 ENCOUNTER — LAB (OUTPATIENT)
Dept: LAB | Facility: HOSPITAL | Age: 69
End: 2024-07-10
Payer: MEDICARE

## 2024-07-10 ENCOUNTER — TELEPHONE (OUTPATIENT)
Dept: GASTROENTEROLOGY | Facility: CLINIC | Age: 69
End: 2024-07-10
Payer: MEDICARE

## 2024-07-10 DIAGNOSIS — R19.7 DIARRHEA, UNSPECIFIED TYPE: Primary | ICD-10-CM

## 2024-07-10 DIAGNOSIS — R19.7 DIARRHEA, UNSPECIFIED TYPE: ICD-10-CM

## 2024-07-10 LAB
ALBUMIN SERPL-MCNC: 4 G/DL (ref 3.5–5.2)
ALBUMIN/GLOB SERPL: 1.5 G/DL
ALP SERPL-CCNC: 83 U/L (ref 39–117)
ALT SERPL W P-5'-P-CCNC: 10 U/L (ref 1–33)
ANION GAP SERPL CALCULATED.3IONS-SCNC: 11.8 MMOL/L (ref 5–15)
AST SERPL-CCNC: 20 U/L (ref 1–32)
BASOPHILS # BLD AUTO: 0.03 10*3/MM3 (ref 0–0.2)
BASOPHILS NFR BLD AUTO: 0.3 % (ref 0–1.5)
BILIRUB SERPL-MCNC: <0.2 MG/DL (ref 0–1.2)
BUN SERPL-MCNC: 10 MG/DL (ref 8–23)
BUN/CREAT SERPL: 12.8 (ref 7–25)
CALCIUM SPEC-SCNC: 9.3 MG/DL (ref 8.6–10.5)
CHLORIDE SERPL-SCNC: 104 MMOL/L (ref 98–107)
CO2 SERPL-SCNC: 26.2 MMOL/L (ref 22–29)
CREAT SERPL-MCNC: 0.78 MG/DL (ref 0.57–1)
DEPRECATED RDW RBC AUTO: 42.9 FL (ref 37–54)
EGFRCR SERPLBLD CKD-EPI 2021: 82.3 ML/MIN/1.73
EOSINOPHIL # BLD AUTO: 0.09 10*3/MM3 (ref 0–0.4)
EOSINOPHIL NFR BLD AUTO: 0.9 % (ref 0.3–6.2)
ERYTHROCYTE [DISTWIDTH] IN BLOOD BY AUTOMATED COUNT: 12.6 % (ref 12.3–15.4)
GLOBULIN UR ELPH-MCNC: 2.6 GM/DL
GLUCOSE SERPL-MCNC: 78 MG/DL (ref 65–99)
HCT VFR BLD AUTO: 37.6 % (ref 34–46.6)
HGB BLD-MCNC: 12.4 G/DL (ref 12–15.9)
IMM GRANULOCYTES # BLD AUTO: 0.04 10*3/MM3 (ref 0–0.05)
IMM GRANULOCYTES NFR BLD AUTO: 0.4 % (ref 0–0.5)
LYMPHOCYTES # BLD AUTO: 2.5 10*3/MM3 (ref 0.7–3.1)
LYMPHOCYTES NFR BLD AUTO: 26.2 % (ref 19.6–45.3)
MCH RBC QN AUTO: 30.7 PG (ref 26.6–33)
MCHC RBC AUTO-ENTMCNC: 33 G/DL (ref 31.5–35.7)
MCV RBC AUTO: 93.1 FL (ref 79–97)
MONOCYTES # BLD AUTO: 0.56 10*3/MM3 (ref 0.1–0.9)
MONOCYTES NFR BLD AUTO: 5.9 % (ref 5–12)
NEUTROPHILS NFR BLD AUTO: 6.34 10*3/MM3 (ref 1.7–7)
NEUTROPHILS NFR BLD AUTO: 66.3 % (ref 42.7–76)
NRBC BLD AUTO-RTO: 0 /100 WBC (ref 0–0.2)
PLATELET # BLD AUTO: 296 10*3/MM3 (ref 140–450)
PMV BLD AUTO: 10 FL (ref 6–12)
POTASSIUM SERPL-SCNC: 3.9 MMOL/L (ref 3.5–5.2)
PROT SERPL-MCNC: 6.6 G/DL (ref 6–8.5)
RBC # BLD AUTO: 4.04 10*6/MM3 (ref 3.77–5.28)
SODIUM SERPL-SCNC: 142 MMOL/L (ref 136–145)
WBC NRBC COR # BLD AUTO: 9.56 10*3/MM3 (ref 3.4–10.8)

## 2024-07-10 PROCEDURE — 85025 COMPLETE CBC W/AUTO DIFF WBC: CPT

## 2024-07-10 PROCEDURE — 36415 COLL VENOUS BLD VENIPUNCTURE: CPT

## 2024-07-10 PROCEDURE — 80053 COMPREHEN METABOLIC PANEL: CPT

## 2024-07-10 NOTE — TELEPHONE ENCOUNTER
Hub staff attempted to follow warm transfer process and was unsuccessful     Caller: Ivory Nava    Relationship to patient: Self    Best call back number: [phone]     Patient is needing: PATIENT WAS SUPPOSE TO TAKE A STOOL STUDY BUT WAS UNABLE TO USE THE BATHROOM SO THEREFORE STOOL STUDY WAS NOT PERFORMED HOWEVER THEY WERE ABLE TO DO BLOOD WORK. PLEASE CALL PATIENT.

## 2024-07-10 NOTE — TELEPHONE ENCOUNTER
S/w pt, she states she has had worsened diarrhea in the last 2 days. She is having 7-10 liquid stools during the day and 2 per night. Pt is having some center ABD cramping but no vomiting. Pt also denies having any blood in stool, lightheadedness or dizziness. Pt states she is feeling a bit weak, and is trying to push fluids.   Pt has been taking colestid.

## 2024-07-10 NOTE — TELEPHONE ENCOUNTER
Patient was put through from Hub and stated that she was having a issue with her medication. I put her through with Brittani Escobar medical assistant and she spoke with the patient.

## 2024-07-10 NOTE — TELEPHONE ENCOUNTER
Per CATIH Tran, pt should take the colestid this evening and the increase in dose would improve diarrhea, not worsen. Attempted to contact pt, no answer but I left a detailed message (okay per VR)

## 2024-07-10 NOTE — TELEPHONE ENCOUNTER
Due to onset of worsening diarrhea I recommend proceeding with stool studies, orders placed.  I also have placed orders for CMP and CBC for further evaluation.  If stool studies are negative may consider trial of Xifaxan.

## 2024-07-10 NOTE — TELEPHONE ENCOUNTER
Pt is wondering if the change in dosage on her colestid could have caused her diarrhea. Pt is also wondering if she should take her evening dose today. Please advise.

## 2024-07-11 ENCOUNTER — LAB (OUTPATIENT)
Dept: LAB | Facility: HOSPITAL | Age: 69
End: 2024-07-11
Payer: MEDICARE

## 2024-07-11 ENCOUNTER — TELEPHONE (OUTPATIENT)
Dept: GASTROENTEROLOGY | Facility: CLINIC | Age: 69
End: 2024-07-11
Payer: MEDICARE

## 2024-07-11 DIAGNOSIS — R19.7 DIARRHEA, UNSPECIFIED TYPE: ICD-10-CM

## 2024-07-11 LAB
027 TOXIN: NORMAL
C DIFF TOX GENS STL QL NAA+PROBE: NEGATIVE
LACTOFERRIN STL QL LA: NEGATIVE

## 2024-07-11 PROCEDURE — 87493 C DIFF AMPLIFIED PROBE: CPT

## 2024-07-11 PROCEDURE — 87506 IADNA-DNA/RNA PROBE TQ 6-11: CPT

## 2024-07-11 PROCEDURE — 83630 LACTOFERRIN FECAL (QUAL): CPT

## 2024-07-11 NOTE — TELEPHONE ENCOUNTER
S/w pt, I explained that we are waiting on the stool studies to result for the plan of care. Pt is currently still not experiencing any lightheadedness/dizziness or vomiting. Pt verbalized understanding and is agreeable to report to the ER if she develops these symptoms.

## 2024-07-11 NOTE — TELEPHONE ENCOUNTER
Provider: DR. SPAULDING    Caller: NATHALIE TORREZ    Relationship to Patient: SELF    Phone Number: [phone]    Reason for Call: PT CALLED STATING THAT SHE WOULD LIKE TO SPEAK WITH A CLINICAL REP IN REFERENCE TO HER DIARRHEA AND OTHER ISSUES// PLEASE CALL PT BACK

## 2024-07-11 NOTE — TELEPHONE ENCOUNTER
Patient came into office and stated that she needs to speak with someone today. She is taking Colestipol mg and she is having more diarrhea. Patient stated that she's getting up in the middle of the night at least 2-3 times, also between 7a-10a she has diarrhea 8-10 times. Patient wants someone to call her back today please.

## 2024-07-11 NOTE — TELEPHONE ENCOUNTER
Hub staff attempted to follow warm transfer process and was unsuccessful     Caller: Ivory Nava    Relationship to patient: Self    Best call back number: [phone]    Patient is needing: PT IS NEEDING A CALL BACK FROM MA. PT HAS A QUESTION ABOUT A MEDICATION SHE IS TAKEN AND DIDN'T TAKE LAST NIGHT. PLEASE GIVE PT A CALL BACK ASAP. SHE HAS TO DROP OFF STOOL SAMPLE TO THE [facility]. SHE SAID SHE WILL STOP BY IF SHE CAN TO ASK THE OFFICE. IF NOT ABLE TO REACH PT. IT IS OKAY TO LVM.

## 2024-08-01 PROCEDURE — 99285 EMERGENCY DEPT VISIT HI MDM: CPT

## 2024-08-01 RX ORDER — SODIUM CHLORIDE 0.9 % (FLUSH) 0.9 %
10 SYRINGE (ML) INJECTION AS NEEDED
Status: DISCONTINUED | OUTPATIENT
Start: 2024-08-01 | End: 2024-08-02 | Stop reason: HOSPADM

## 2024-08-02 ENCOUNTER — APPOINTMENT (OUTPATIENT)
Dept: ULTRASOUND IMAGING | Facility: HOSPITAL | Age: 69
End: 2024-08-02
Payer: MEDICARE

## 2024-08-02 ENCOUNTER — HOSPITAL ENCOUNTER (EMERGENCY)
Facility: HOSPITAL | Age: 69
Discharge: HOME | End: 2024-08-02
Attending: EMERGENCY MEDICINE
Payer: MEDICARE

## 2024-08-02 ENCOUNTER — APPOINTMENT (OUTPATIENT)
Dept: CT IMAGING | Facility: HOSPITAL | Age: 69
End: 2024-08-02
Payer: MEDICARE

## 2024-08-02 VITALS
DIASTOLIC BLOOD PRESSURE: 59 MMHG | BODY MASS INDEX: 22.11 KG/M2 | WEIGHT: 132.72 LBS | OXYGEN SATURATION: 97 % | RESPIRATION RATE: 18 BRPM | SYSTOLIC BLOOD PRESSURE: 124 MMHG | TEMPERATURE: 98.1 F | HEART RATE: 69 BPM | HEIGHT: 65 IN

## 2024-08-02 DIAGNOSIS — K52.9 ENTEROCOLITIS: Primary | ICD-10-CM

## 2024-08-02 LAB
027 TOXIN: NORMAL
ALBUMIN SERPL-MCNC: 3.9 G/DL (ref 3.5–5.2)
ALBUMIN/GLOB SERPL: 1.4 G/DL
ALP SERPL-CCNC: 89 U/L (ref 39–117)
ALT SERPL W P-5'-P-CCNC: 9 U/L (ref 1–33)
ANION GAP SERPL CALCULATED.3IONS-SCNC: 9.7 MMOL/L (ref 5–15)
AST SERPL-CCNC: 17 U/L (ref 1–32)
BACTERIA UR QL AUTO: ABNORMAL /HPF
BASOPHILS # BLD AUTO: 0.04 10*3/MM3 (ref 0–0.2)
BASOPHILS NFR BLD AUTO: 0.4 % (ref 0–1.5)
BILIRUB SERPL-MCNC: 0.5 MG/DL (ref 0–1.2)
BILIRUB UR QL STRIP: NEGATIVE
BUN SERPL-MCNC: 13 MG/DL (ref 8–23)
BUN/CREAT SERPL: 14.8 (ref 7–25)
C COLI+JEJ+UPSA DNA STL QL NAA+NON-PROBE: NOT DETECTED
C DIFF TOX GENS STL QL NAA+PROBE: NEGATIVE
CALCIUM SPEC-SCNC: 9.3 MG/DL (ref 8.6–10.5)
CHLORIDE SERPL-SCNC: 103 MMOL/L (ref 98–107)
CLARITY UR: CLEAR
CO2 SERPL-SCNC: 27.3 MMOL/L (ref 22–29)
COD CRY URNS QL: ABNORMAL /HPF
COLOR UR: YELLOW
CREAT SERPL-MCNC: 0.88 MG/DL (ref 0.57–1)
D-LACTATE SERPL-SCNC: 0.8 MMOL/L (ref 0.5–2)
DEPRECATED RDW RBC AUTO: 45.6 FL (ref 37–54)
EC STX1+STX2 GENES STL QL NAA+NON-PROBE: NOT DETECTED
EGFRCR SERPLBLD CKD-EPI 2021: 71.2 ML/MIN/1.73
EOSINOPHIL # BLD AUTO: 0.08 10*3/MM3 (ref 0–0.4)
EOSINOPHIL NFR BLD AUTO: 0.7 % (ref 0.3–6.2)
ERYTHROCYTE [DISTWIDTH] IN BLOOD BY AUTOMATED COUNT: 13.2 % (ref 12.3–15.4)
GLOBULIN UR ELPH-MCNC: 2.8 GM/DL
GLUCOSE SERPL-MCNC: 115 MG/DL (ref 65–99)
GLUCOSE UR STRIP-MCNC: NEGATIVE MG/DL
HCT VFR BLD AUTO: 42.3 % (ref 34–46.6)
HGB BLD-MCNC: 13.9 G/DL (ref 12–15.9)
HGB UR QL STRIP.AUTO: NEGATIVE
HOLD SPECIMEN: NORMAL
HOLD SPECIMEN: NORMAL
HYALINE CASTS UR QL AUTO: ABNORMAL /LPF
IMM GRANULOCYTES # BLD AUTO: 0.04 10*3/MM3 (ref 0–0.05)
IMM GRANULOCYTES NFR BLD AUTO: 0.4 % (ref 0–0.5)
KETONES UR QL STRIP: NEGATIVE
LEUKOCYTE ESTERASE UR QL STRIP.AUTO: ABNORMAL
LIPASE SERPL-CCNC: 21 U/L (ref 13–60)
LYMPHOCYTES # BLD AUTO: 1.58 10*3/MM3 (ref 0.7–3.1)
LYMPHOCYTES NFR BLD AUTO: 14 % (ref 19.6–45.3)
MCH RBC QN AUTO: 30.9 PG (ref 26.6–33)
MCHC RBC AUTO-ENTMCNC: 32.9 G/DL (ref 31.5–35.7)
MCV RBC AUTO: 94 FL (ref 79–97)
MONOCYTES # BLD AUTO: 0.79 10*3/MM3 (ref 0.1–0.9)
MONOCYTES NFR BLD AUTO: 7 % (ref 5–12)
NEUTROPHILS NFR BLD AUTO: 77.5 % (ref 42.7–76)
NEUTROPHILS NFR BLD AUTO: 8.79 10*3/MM3 (ref 1.7–7)
NITRITE UR QL STRIP: NEGATIVE
NRBC BLD AUTO-RTO: 0 /100 WBC (ref 0–0.2)
PH UR STRIP.AUTO: 5.5 [PH] (ref 5–8)
PLATELET # BLD AUTO: 281 10*3/MM3 (ref 140–450)
PMV BLD AUTO: 9.8 FL (ref 6–12)
POTASSIUM SERPL-SCNC: 4.2 MMOL/L (ref 3.5–5.2)
PROT SERPL-MCNC: 6.7 G/DL (ref 6–8.5)
PROT UR QL STRIP: NEGATIVE
RBC # BLD AUTO: 4.5 10*6/MM3 (ref 3.77–5.28)
RBC # UR STRIP: ABNORMAL /HPF
REF LAB TEST METHOD: ABNORMAL
S ENT+BONG DNA STL QL NAA+NON-PROBE: NOT DETECTED
SHIGELLA SP+EIEC IPAH ST NAA+NON-PROBE: NOT DETECTED
SODIUM SERPL-SCNC: 140 MMOL/L (ref 136–145)
SP GR UR STRIP: 1.02 (ref 1–1.03)
SQUAMOUS #/AREA URNS HPF: ABNORMAL /HPF
UROBILINOGEN UR QL STRIP: ABNORMAL
WBC # UR STRIP: ABNORMAL /HPF
WBC NRBC COR # BLD AUTO: 11.32 10*3/MM3 (ref 3.4–10.8)
WHOLE BLOOD HOLD COAG: NORMAL
WHOLE BLOOD HOLD SPECIMEN: NORMAL

## 2024-08-02 PROCEDURE — 25010000002 DICYCLOMINE PER 20 MG: Performed by: EMERGENCY MEDICINE

## 2024-08-02 PROCEDURE — 83605 ASSAY OF LACTIC ACID: CPT

## 2024-08-02 PROCEDURE — 25510000001 IOPAMIDOL PER 1 ML: Performed by: EMERGENCY MEDICINE

## 2024-08-02 PROCEDURE — 87505 NFCT AGENT DETECTION GI: CPT | Performed by: REGISTERED NURSE

## 2024-08-02 PROCEDURE — 36415 COLL VENOUS BLD VENIPUNCTURE: CPT

## 2024-08-02 PROCEDURE — 87506 IADNA-DNA/RNA PROBE TQ 6-11: CPT | Performed by: REGISTERED NURSE

## 2024-08-02 PROCEDURE — 87493 C DIFF AMPLIFIED PROBE: CPT | Performed by: EMERGENCY MEDICINE

## 2024-08-02 PROCEDURE — 74177 CT ABD & PELVIS W/CONTRAST: CPT

## 2024-08-02 PROCEDURE — 96374 THER/PROPH/DIAG INJ IV PUSH: CPT

## 2024-08-02 PROCEDURE — 25010000002 ONDANSETRON PER 1 MG: Performed by: EMERGENCY MEDICINE

## 2024-08-02 PROCEDURE — 96372 THER/PROPH/DIAG INJ SC/IM: CPT

## 2024-08-02 PROCEDURE — 83690 ASSAY OF LIPASE: CPT

## 2024-08-02 PROCEDURE — 76705 ECHO EXAM OF ABDOMEN: CPT

## 2024-08-02 PROCEDURE — 25010000002 MORPHINE PER 10 MG: Performed by: EMERGENCY MEDICINE

## 2024-08-02 PROCEDURE — 81001 URINALYSIS AUTO W/SCOPE: CPT | Performed by: EMERGENCY MEDICINE

## 2024-08-02 PROCEDURE — 85025 COMPLETE CBC W/AUTO DIFF WBC: CPT

## 2024-08-02 PROCEDURE — 96375 TX/PRO/DX INJ NEW DRUG ADDON: CPT

## 2024-08-02 PROCEDURE — 80053 COMPREHEN METABOLIC PANEL: CPT

## 2024-08-02 RX ORDER — ONDANSETRON 4 MG/1
8 TABLET, ORALLY DISINTEGRATING ORAL EVERY 8 HOURS PRN
Qty: 15 TABLET | Refills: 0 | Status: SHIPPED | OUTPATIENT
Start: 2024-08-02 | End: 2024-08-07

## 2024-08-02 RX ORDER — ONDANSETRON 2 MG/ML
4 INJECTION INTRAMUSCULAR; INTRAVENOUS ONCE
Status: COMPLETED | OUTPATIENT
Start: 2024-08-02 | End: 2024-08-02

## 2024-08-02 RX ORDER — FAMOTIDINE 10 MG/ML
20 INJECTION, SOLUTION INTRAVENOUS ONCE
Status: COMPLETED | OUTPATIENT
Start: 2024-08-02 | End: 2024-08-02

## 2024-08-02 RX ORDER — HYDROCODONE BITARTRATE AND ACETAMINOPHEN 5; 325 MG/1; MG/1
1 TABLET ORAL EVERY 4 HOURS PRN
Qty: 18 TABLET | Refills: 0 | Status: SHIPPED | OUTPATIENT
Start: 2024-08-02 | End: 2024-08-05

## 2024-08-02 RX ORDER — DICYCLOMINE HYDROCHLORIDE 10 MG/ML
20 INJECTION INTRAMUSCULAR ONCE
Status: COMPLETED | OUTPATIENT
Start: 2024-08-02 | End: 2024-08-02

## 2024-08-02 RX ORDER — DICYCLOMINE HCL 20 MG
20 TABLET ORAL EVERY 6 HOURS
Qty: 28 TABLET | Refills: 0 | Status: SHIPPED | OUTPATIENT
Start: 2024-08-02 | End: 2024-08-09

## 2024-08-02 RX ADMIN — FAMOTIDINE 20 MG: 10 INJECTION INTRAVENOUS at 01:19

## 2024-08-02 RX ADMIN — DICYCLOMINE HYDROCHLORIDE 20 MG: 10 INJECTION, SOLUTION INTRAMUSCULAR at 01:18

## 2024-08-02 RX ADMIN — ONDANSETRON 4 MG: 2 INJECTION INTRAMUSCULAR; INTRAVENOUS at 01:18

## 2024-08-02 RX ADMIN — MORPHINE SULFATE 4 MG: 4 INJECTION, SOLUTION INTRAMUSCULAR; INTRAVENOUS at 01:18

## 2024-08-02 RX ADMIN — IOPAMIDOL 100 ML: 755 INJECTION, SOLUTION INTRAVENOUS at 02:40

## 2024-08-02 NOTE — ED PROVIDER NOTES
Time: 11:57 PM EDT  Date of encounter:  8/1/2024  Independent Historian/Clinical History and Information was obtained by:   Patient    History is limited by: N/A    Chief Complaint   Patient presents with    Abdominal Pain         History of Present Illness:  Patient is a 68 y.o. year old female who presents to the emergency department for evaluation of diarrhea and abdominal pain that started yesterday.  Patient notes she first began having diarrhea yesterday.  She notes that she began having abdominal pain today.  She localizes abdominal pain across the upper abdomen.  She has had nausea.  She has vomited several times.  There is been no coffee-ground emesis or mild emesis.  Patient states that she is having more than 10 loose stools a day.  The patient has no fever, rigors or myalgias.  The patient has no chest pain, back pain or shortness of breath.  Patient denies any urinary frequency urgency dysuria.  Patient denies any known bad food exposure.  She has had no foreign travel.  She has not been on antibiotics in the last 30 days.  She has never had C. difficile colitis.  She has had no surgeries on her abdomen.    Patient Care Team  Primary Care Provider: Darrian Oreilly MD    Past Medical History:     Allergies   Allergen Reactions    Azithromycin Other (See Comments)     tonung pilling and swelling     Penicillins Anaphylaxis    Lorazepam Mental Status Change and Unknown - High Severity     Suicidal    Sulfa Antibiotics Nausea Only and Unknown - High Severity     Past Medical History:   Diagnosis Date    Anxiety     Brain tumor (benign)     Cervical cancer     GERD (gastroesophageal reflux disease)     History of chemotherapy     History of radiation therapy     Seasonal allergies      Past Surgical History:   Procedure Laterality Date    BRAIN TUMOR EXCISION      COLONOSCOPY N/A 7/12/2022    Procedure: COLONOSCOPY WITH POLYPECTOMY/SNARE/BIOPSIES;  Surgeon: Darrian Almanzar MD;  Location: [facility]  ENDOSCOPY;  Service: Gastroenterology;  Laterality: N/A;  COLON POLYP  PROCTITIS    SIGMOIDOSCOPY N/A 6/28/2024    Procedure: COLONOSCOPY WITH BIOPSIES AND POLYPECTOMY;  Surgeon: Darrian Almanzar MD;  Location: [facility] ENDOSCOPY;  Service: Gastroenterology;  Laterality: N/A;  COLON POLYP, DIVERTICULOSIS     Family History   Problem Relation Age of Onset    Cancer Mother     Colon cancer Neg Hx     Malig Hyperthermia Neg Hx        Home Medications:  Prior to Admission medications    Medication Sig Start Date End Date Taking? Authorizing Provider   colestipol (COLESTID) 5 g packet Take half packet every night at bedtime.  Hold if no bowel movement for 24 hours 7/5/24   Brittani Oseguera APRN   fluticasone (FLONASE) 50 MCG/ACT nasal spray 2 sprays into the nostril(s) as directed by provider Daily. 1/10/24   Mary Rodriguez APRN   Loratadine (Claritin) 10 MG capsule Take  by mouth.    Evelia Castro MD   multivitamin with minerals tablet tablet Take 1 tablet by mouth Daily.    Evelia Castro MD   PARoxetine (PAXIL) 20 MG tablet Take 0.5 tablets by mouth Every Night. 1/10/22   Evelia Castro MD        Social History:   Social History     Tobacco Use    Smoking status: Every Day     Current packs/day: 1.00     Average packs/day: 1 pack/day for 40.0 years (40.0 ttl pk-yrs)     Types: Cigarettes    Smokeless tobacco: Never   Vaping Use    Vaping status: Never Used   Substance Use Topics    Alcohol use: Not Currently    Drug use: Never         Review of Systems:  Review of Systems   Constitutional:  Negative for chills, diaphoresis and fever.   HENT:  Negative for congestion, postnasal drip, rhinorrhea and sore throat.    Eyes:  Negative for photophobia.   Respiratory:  Negative for cough, chest tightness and shortness of breath.    Cardiovascular:  Negative for chest pain, palpitations and leg swelling.   Gastrointestinal:  Positive for abdominal pain, diarrhea, nausea and vomiting.  "  Genitourinary:  Negative for difficulty urinating, dysuria, flank pain, frequency, hematuria and urgency.   Musculoskeletal:  Negative for neck pain and neck stiffness.   Skin:  Negative for pallor and rash.   Neurological:  Negative for dizziness, syncope, weakness, numbness and headaches.   Hematological:  Negative for adenopathy. Does not bruise/bleed easily.   Psychiatric/Behavioral: Negative.          Physical Exam:  /59   Pulse 69   Temp 98.4 °F (36.9 °C) (Oral)   Resp 20   Ht 165.1 cm (65\")   Wt 60.2 kg (132 lb 11.5 oz)   SpO2 97%   BMI 22.09 kg/m²         Physical Exam  Vitals and nursing note reviewed.   Constitutional:       General: She is not in acute distress.     Appearance: Normal appearance. She is not ill-appearing, toxic-appearing or diaphoretic.   HENT:      Head: Normocephalic and atraumatic.      Mouth/Throat:      Mouth: Mucous membranes are moist.   Eyes:      Pupils: Pupils are equal, round, and reactive to light.   Cardiovascular:      Rate and Rhythm: Normal rate and regular rhythm.      Pulses: Normal pulses.           Carotid pulses are 2+ on the right side and 2+ on the left side.       Radial pulses are 2+ on the right side and 2+ on the left side.        Femoral pulses are 2+ on the right side and 2+ on the left side.       Popliteal pulses are 2+ on the right side and 2+ on the left side.        Dorsalis pedis pulses are 2+ on the right side and 2+ on the left side.        Posterior tibial pulses are 2+ on the right side and 2+ on the left side.      Heart sounds: Normal heart sounds. No murmur heard.  Pulmonary:      Effort: Pulmonary effort is normal. No accessory muscle usage, respiratory distress or retractions.      Breath sounds: Normal breath sounds. No wheezing, rhonchi or rales.   Abdominal:      General: Abdomen is flat. There is no distension.      Palpations: Abdomen is soft. There is no mass or pulsatile mass.      Tenderness: There is abdominal tenderness " in the right upper quadrant, epigastric area and left upper quadrant. There is no right CVA tenderness, left CVA tenderness, guarding or rebound.      Comments: No rigidity   Musculoskeletal:         General: No swelling, tenderness or deformity.      Cervical back: Neck supple. No tenderness.      Right lower leg: No edema.      Left lower leg: No edema.   Skin:     General: Skin is warm and dry.      Capillary Refill: Capillary refill takes less than 2 seconds.      Coloration: Skin is not jaundiced or pale.      Findings: No erythema.   Neurological:      General: No focal deficit present.      Mental Status: She is alert and oriented to person, place, and time. Mental status is at baseline.      Cranial Nerves: Cranial nerves 2-12 are intact. No cranial nerve deficit.      Sensory: Sensation is intact. No sensory deficit.      Motor: Motor function is intact. No weakness or pronator drift.      Coordination: Coordination is intact. Coordination normal.   Psychiatric:         Mood and Affect: Mood normal.         Behavior: Behavior normal.                  Procedures:  Procedures      Medical Decision Making:      Comorbidities that affect care:    Anxiety, GERD    External Notes reviewed:    None      The following orders were placed and all results were independently analyzed by me:  Orders Placed This Encounter   Procedures    Enteric Bacterial Panel - Stool, Per Rectum    Enteric Parasite Panel - Stool, Per Rectum    Clostridioides difficile Toxin - Stool, Per Rectum    Clostridioides difficile Toxin, PCR - Stool, Per Rectum    US Gallbladder    CT Abdomen Pelvis With Contrast    [facility] Draw    Comprehensive Metabolic Panel    Lipase    Urinalysis With Microscopic If Indicated (No Culture) - Urine, Clean Catch    Lactic Acid, Plasma    CBC Auto Differential    Urinalysis, Microscopic Only - Urine, Clean Catch    NPO Diet NPO Type: Strict NPO    Undress & Gown    Insert Peripheral IV    CBC & Differential     Green Top (Gel)    Lavender Top    Gold Top - SST    Light Blue Top       Medications Given in the Emergency Department:  Medications   sodium chloride 0.9 % flush 10 mL (has no administration in time range)   morphine injection 4 mg (4 mg Intravenous Given 8/2/24 0118)   ondansetron (ZOFRAN) injection 4 mg (4 mg Intravenous Given 8/2/24 0118)   famotidine (PEPCID) injection 20 mg (20 mg Intravenous Given 8/2/24 0119)   dicyclomine (BENTYL) injection 20 mg (20 mg Intramuscular Given 8/2/24 0118)   iopamidol (ISOVUE-370) 76 % injection 100 mL (100 mL Intravenous Given 8/2/24 0240)        ED Course:    The patient was initially evaluated in the triage area where orders were placed. The patient was later dispositioned by Kenn Macias DO.      The patient was advised to stay for completion of workup which includes but is not limited to communication of labs and radiological results, reassessment and plan. The patient was advised that leaving prior to disposition by a provider could result in critical findings that are not communicated to the patient.          Labs:    Lab Results (last 24 hours)       Procedure Component Value Units Date/Time    CBC & Differential [[id number]]  (Abnormal) Collected: 08/02/24 0002    Specimen: Blood from Arm, Right Updated: 08/02/24 0008    Narrative:      The following orders were created for panel order CBC & Differential.  Procedure                               Abnormality         Status                     ---------                               -----------         ------                     CBC Auto Differential[[id number]]        Abnormal            Final result                 Please view results for these tests on the individual orders.    Comprehensive Metabolic Panel [[id number]]  (Abnormal) Collected: 08/02/24 0002    Specimen: Blood from Arm, Right Updated: 08/02/24 0029     Glucose 115 mg/dL      BUN 13 mg/dL      Creatinine 0.88 mg/dL      Sodium 140 mmol/L      Potassium  4.2 mmol/L      Chloride 103 mmol/L      CO2 27.3 mmol/L      Calcium 9.3 mg/dL      Total Protein 6.7 g/dL      Albumin 3.9 g/dL      ALT (SGPT) 9 U/L      AST (SGOT) 17 U/L      Alkaline Phosphatase 89 U/L      Total Bilirubin 0.5 mg/dL      Globulin 2.8 gm/dL      A/G Ratio 1.4 g/dL      BUN/Creatinine Ratio 14.8     Anion Gap 9.7 mmol/L      eGFR 71.2 mL/min/1.73     Narrative:      GFR Normal >60  Chronic Kidney Disease <60  Kidney Failure <15      Lipase [[id number]]  (Normal) Collected: 08/02/24 0002    Specimen: Blood from Arm, Right Updated: 08/02/24 0029     Lipase 21 U/L     Lactic Acid, Plasma [[id number]]  (Normal) Collected: 08/02/24 0002    Specimen: Blood from Arm, Right Updated: 08/02/24 0026     Lactate 0.8 mmol/L     CBC Auto Differential [[id number]]  (Abnormal) Collected: 08/02/24 0002    Specimen: Blood from Arm, Right Updated: 08/02/24 0008     WBC 11.32 10*3/mm3      RBC 4.50 10*6/mm3      Hemoglobin 13.9 g/dL      Hematocrit 42.3 %      MCV 94.0 fL      MCH 30.9 pg      MCHC 32.9 g/dL      RDW 13.2 %      RDW-SD 45.6 fl      MPV 9.8 fL      Platelets 281 10*3/mm3      Neutrophil % 77.5 %      Lymphocyte % 14.0 %      Monocyte % 7.0 %      Eosinophil % 0.7 %      Basophil % 0.4 %      Immature Grans % 0.4 %      Neutrophils, Absolute 8.79 10*3/mm3      Lymphocytes, Absolute 1.58 10*3/mm3      Monocytes, Absolute 0.79 10*3/mm3      Eosinophils, Absolute 0.08 10*3/mm3      Basophils, Absolute 0.04 10*3/mm3      Immature Grans, Absolute 0.04 10*3/mm3      nRBC 0.0 /100 WBC     Urinalysis With Microscopic If Indicated (No Culture) - Urine, Clean Catch [[id number]]  (Abnormal) Collected: 08/02/24 0029    Specimen: Urine, Clean Catch Updated: 08/02/24 0037     Color, UA Yellow     Appearance, UA Clear     pH, UA 5.5     Specific Gravity, UA 1.020     Glucose, UA Negative     Ketones, UA Negative     Bilirubin, UA Negative     Blood, UA Negative     Protein, UA Negative     Leuk Esterase, UA Small (1+)      Nitrite, UA Negative     Urobilinogen, UA 0.2 E.U./dL    Enteric Bacterial Panel - Stool, Per Rectum [[id number]] Collected: 08/02/24 0029    Specimen: Stool from Per Rectum Updated: 08/02/24 0032    Enteric Parasite Panel - Stool, Per Rectum [[id number]] Collected: 08/02/24 0029    Specimen: Stool from Per Rectum Updated: 08/02/24 0032    Urinalysis, Microscopic Only - Urine, Clean Catch [[id number]]  (Abnormal) Collected: 08/02/24 0029    Specimen: Urine, Clean Catch Updated: 08/02/24 0053     RBC, UA 0-2 /HPF      WBC, UA 3-5 /HPF      Bacteria, UA None Seen /HPF      Squamous Epithelial Cells, UA 3-6 /HPF      Hyaline Casts, UA 3-6 /LPF      Calcium Oxalate Crystals, UA Small/1+ /HPF      Methodology Manual Light Microscopy    Clostridioides difficile Toxin - Stool, Per Rectum [[id number]] Collected: 08/02/24 0029    Specimen: Stool from Per Rectum Updated: 08/02/24 0250    Narrative:      The following orders were created for panel order Clostridioides difficile Toxin - Stool, Per Rectum.  Procedure                               Abnormality         Status                     ---------                               -----------         ------                     Clostridioides difficile...[[id number]]                      Final result                 Please view results for these tests on the individual orders.    Clostridioides difficile Toxin, PCR - Stool, Per Rectum [[id number]] Collected: 08/02/24 0029    Specimen: Stool from Per Rectum Updated: 08/02/24 0250     Toxigenic C. difficile by PCR Negative     027 Toxin Presumptive Negative    Narrative:      The result indicates the absence of toxigenic C. difficile from stool specimen.              Imaging:    CT Abdomen Pelvis With Contrast    Result Date: 8/2/2024  CT ABDOMEN PELVIS W CONTRAST-  Date of exam: 8/2/2024, 2:22 A.M.  Indication: Abdominal pain, not otherwise specified.  Comparison: 11/18/2022.  TECHNIQUE: Axial CT images were obtained of the abdomen  and pelvis following the uneventful intravenous administration of 70 mL of Isovue-370 contrast agent. Reconstructed 2D (two-dimensional) coronal and sagittal images were also obtained. Automated exposure control and iterative construction methods were used. No oral contrast agent was administered for the study.  FINDINGS: Dilated mid small bowel is seen with relatively nondilated proximal and very distal small bowel, as before; the findings may represent an age-indeterminate infectious/inflammatory enteritis. A focal adynamic ileus is possible. A mechanical bowel obstruction, possibly related to a closed loop obstruction, volvulus, post infectious/inflammatory stricture, or adhesion, would be in the differential diagnosis, either partial or complete in nature. On the current study, there is new dilatation of the colon with fluid. This finding may be related to infectious/inflammatory colitis. Minimal, if any, mural thickening is appreciated involving the colon, however. A generalized adynamic ileus is possible. There is new or increased ascites, especially in the inferior, posterior right pelvis. It has a CT number near 3 Hounsfield units or less. No pneumoperitoneum or pneumatosis. No portal or mesenteric venous gas. No definite perienteric or pericolonic fluid collection is seen to suggest abscess. Atherosclerotic change involves the origin of the celiac trunk with possible moderate stenosis. No hemodynamically significant stenosis involves the proximal superior mesenteric artery (SMA). The inferior mesenteric artery is small in caliber with probable calcified plaque at its origin. There may be narrowing of the very distal abdominal aorta and the proximal bilateral common iliac arteries. Gallstones are seen without acute cholecystitis or acute pancreatitis. The appendix is not clearly identified. It may be surgically absent, as well. The patient has undergone hysterectomy. Otherwise, no significant interval change  is appreciated since the 11/18/2022 CT study with additional findings as detailed in that exam's report.       There is possible age-indeterminate infectious/inflammatory enterocolitis. An associated mechanical small bowel obstruction cannot be excluded, as discussed. No pneumoperitoneum or pneumatosis. No portal or mesenteric venous gas. Please see above comments for further detail.   Please note that portions of this note were completed with a voice recognition program.   Electronically Signed By-Roberto Langston MD On:8/2/2024 3:03 AM      US Gallbladder    Result Date: 8/2/2024  US GALLBLADDER-  Date of exam: 8/2/2024, 1:14 A.M.  Comparison: 11/18/2022.  INDICATION: Right upper quadrant abdominal pain.  TECHNIQUE: A limited abdominal ultrasound examination of the right upper quadrant was performed, tailored in order to evaluate the gallbladder and the biliary tree.  FINDINGS: Two-dimensional (2D) grayscale (B-mode) images as well as color and spectral Doppler analysis were performed. The patient was fasting as per protocol. The gallbladder is mildly distended (9.1 x 3.3 x 3.9 cm) and contains at least one mobile 9 mm stone. Several gallbladder polyps are present. They measure about 5 mm or less in size. A may be a manifestation of cholesterolosis. There is minimal if any thickening of the gallbladder wall (2.3 mm). No pericholecystic fluid is seen. The common bile duct diameter is 3.3 mm. No choledocholithiasis is seen. The entire extrahepatic biliary tree is not visualized. No intrahepatic biliary ductal dilatation is seen. Doppler interrogation of the hepatic vasculature reveals patent vessels with normal blood flow direction. The visualized portions of the pancreas, liver, and right kidney are unremarkable. The xscl-lb-vjpv length of the right kidney is 9.2 cm. No right hydronephrosis is seen. The craniocaudal dimension of the right lobe of the liver is 14.2 cm. The left kidney, spleen, inferior vena cava, and  abdominal aorta were not evaluated. A negative sonographic Anna's sign was reported.       At least one gallstone is seen without definite acute cholecystitis by ultrasound examination. Gallbladder polyps are also suspected. No biliary ductal dilatation is seen. The other findings are as discussed above.   Please note that portions of this note were completed with a voice recognition program.       Electronically Signed By-Roberto Langston MD On:8/2/2024 2:09 AM         Differential Diagnosis and Discussion:      Diarrhea: Differential diagnosis includes but is not limited to malabsorption syndrome, bacterial infection, carcinoid syndrome, pancreatic hypersecretion, viral infection, celiac sprue, Crohn's disease, ulcerative colitis, ischemic colitis, colitis, hypermotility, and irritable bowel syndrome.    All labs were reviewed and interpreted by me.    MDM  Number of Diagnoses or Management Options  Enterocolitis  Diagnosis management comments: The patient's urinalysis was negative for obvious infection or blood    The patient's stool was checked for C. difficile and it was negative    Stool culture for enteric pathogens was performed.  It is pending at the time of disposition.  The patient will follow-up with her doctor to receive the results    The patient's CBC was reviewed and shows no abnormalities of critical concern.  Of note, there is no anemia requiring a blood transfusion and the platelet count is acceptable    The patient's CMP was reviewed and shows no abnormalities of critical concern.  Of note, the patient's sodium and potassium are acceptable.  The patient's liver enzymes are unremarkable.  The patient's renal function including creatinine is preserved.  The patient has a normal anion gap.    There are some the gallbladder demonstrated gallstones but no evidence of acute cholecystitis or other pathology    CT scan of the and pelvis with IV contrast demonstrates age-indeterminate infectious versus  inflammatory enterocolitis.  An associates mechanical small bowel obstruction cannot be excluded.  However it was seen on previous studies.  No other acute abnormalities.  I discussed this with the patient.  The patient has been told that she has a kink in her bowel before.  The patient is aware and has discussed this with gastroenterology in the past    Patient was given IV fluids morphine Zofran and Bentyl.  The patient was reassessed.  The patient states that her pain is completely gone.  The patient's nausea is improved.  Clinically it does not appear that that patient has a bowel obstruction.  Again, the patient's primary complaint was diarrhea which again is against a bowel obstruction is more consistent with enterocolitis.    The patient be discharged home.  The patient will follow-up with her doctor on Monday to recheck the her abdomen as today is Friday.  The patient will be placed on hydrocodone, Zofran and Bentyl.  The patient will push oral fluids.  The patient will advance her diet over the next 24 hours.    The patient is resting comfortably and feels better, is alert and in no distress.  Repeat examination is unremarkable and benign; in particular, there is no discomfort at McBurney's point and there is no pulsatile mass.  The history, exam, diagnostic testing and current condition does not suggest acute appendicitis, bowel obstruction, acute cholecystitis bowel perforation, major gastrointestinal bleeding, severe diverticulitis, abdominal aortic aneurysm, mesenteric ischemia, volvulus, sepsis or other significant pathology that warrants further testing, continued ED treatment, admission for surgical evaluation at this point.  The vital signs have been stable.  The patient does not have uncontrolled pain intractable vomiting or other significant symptoms.  The patient's condition is stable and appropriate for discharge from the emergency department.  The patient will follow up with her primary care  physician for serial reexamination of the abdomen tomorrow.  The patient was instructed to return should they have worsening pain, intractable vomiting, fever or new symptoms       Amount and/or Complexity of Data Reviewed  Clinical lab tests: reviewed         Social Determinants of Health:    Patient is independent, reliable, and has access to care.       Disposition and Care Coordination:    Discharged: The patient is suitable and stable for discharge with no need for consideration of admission.    I have explained discharge medications and the need for follow up with the patient/caretakers. This was also printed in the discharge instructions. Patient was discharged with the following medications and follow up:      Medication List        New Prescriptions      dicyclomine 20 MG tablet  Commonly known as: BENTYL  Take 1 tablet by mouth Every 6 (Six) Hours for 7 days.     HYDROcodone-acetaminophen 5-325 MG per tablet  Commonly known as: NORCO  Take 1 tablet by mouth Every 4 (Four) Hours As Needed for Moderate Pain for up to 3 days.     ondansetron ODT 4 MG disintegrating tablet  Commonly known as: ZOFRAN-ODT  Place 2 tablets on the tongue Every 8 (Eight) Hours As Needed for Nausea or Vomiting for up to 5 days.               Where to Get Your Medications        These medications were sent to [id number] - [place], [place] - [address] AT [facility] OF [address] & [place] - [phone] [facility] [phone]   [address] [place] [address]      Phone: [phone]   dicyclomine 20 MG tablet  HYDROcodone-acetaminophen 5-325 MG per tablet  ondansetron ODT 4 MG disintegrating tablet      Darrian Oreilly MD  [address] [place] [address] [phone]    On 8/5/2024  Serial reexamination of the abdomen       Final diagnoses:   Enterocolitis        ED Disposition       ED Disposition   Discharge    Condition   Stable    Comment   --               This medical record created using voice  recognition software.             Kenn Macias DO  08/02/24 6629

## 2024-08-02 NOTE — DISCHARGE INSTRUCTIONS
Liquid diet only for the next 24 hours and then advance your diet very slowly as tolerated    Please push oral fluids    Please follow-up with your doctor Monday for serial reexamination the abdomen.  Return to the emergency room immediately for intractable pain, intractable vomiting, fever, shaking chills, muscle aches, near passing out, passing out or any new symptoms you are concerned with

## 2024-08-04 LAB
CRYPTOSP DNA STL QL NAA+NON-PROBE: NOT DETECTED
E HISTOLYT DNA STL QL NAA+NON-PROBE: NOT DETECTED
G LAMBLIA DNA STL QL NAA+NON-PROBE: NOT DETECTED

## 2024-08-06 ENCOUNTER — HOSPITAL ENCOUNTER (OUTPATIENT)
Facility: HOSPITAL | Age: 69
Setting detail: OBSERVATION
Discharge: HOME | End: 2024-08-07
Attending: EMERGENCY MEDICINE | Admitting: INTERNAL MEDICINE
Payer: MEDICARE

## 2024-08-06 DIAGNOSIS — R10.9 ABDOMINAL PAIN, UNSPECIFIED ABDOMINAL LOCATION: Primary | ICD-10-CM

## 2024-08-06 PROBLEM — E87.6 HYPOKALEMIA: Status: ACTIVE | Noted: 2024-08-06

## 2024-08-06 PROBLEM — K52.9 ACUTE GASTROENTERITIS: Status: ACTIVE | Noted: 2024-08-06

## 2024-08-06 LAB
ALBUMIN SERPL-MCNC: 3.5 G/DL (ref 3.5–5.2)
ALBUMIN/GLOB SERPL: 1.4 G/DL
ALP SERPL-CCNC: 70 U/L (ref 39–117)
ALT SERPL W P-5'-P-CCNC: 11 U/L (ref 1–33)
ANION GAP SERPL CALCULATED.3IONS-SCNC: 10.5 MMOL/L (ref 5–15)
AST SERPL-CCNC: 18 U/L (ref 1–32)
BASOPHILS # BLD AUTO: 0.03 10*3/MM3 (ref 0–0.2)
BASOPHILS NFR BLD AUTO: 0.4 % (ref 0–1.5)
BILIRUB SERPL-MCNC: 0.4 MG/DL (ref 0–1.2)
BILIRUB UR QL STRIP: NEGATIVE
BUN SERPL-MCNC: 10 MG/DL (ref 8–23)
BUN/CREAT SERPL: 14.9 (ref 7–25)
CALCIUM SPEC-SCNC: 8.6 MG/DL (ref 8.6–10.5)
CHLORIDE SERPL-SCNC: 101 MMOL/L (ref 98–107)
CLARITY UR: CLEAR
CO2 SERPL-SCNC: 25.5 MMOL/L (ref 22–29)
COLOR UR: YELLOW
CREAT SERPL-MCNC: 0.67 MG/DL (ref 0.57–1)
CRP SERPL-MCNC: 4.83 MG/DL (ref 0–0.5)
D-LACTATE SERPL-SCNC: 1.1 MMOL/L (ref 0.5–2)
DEPRECATED RDW RBC AUTO: 44.9 FL (ref 37–54)
EGFRCR SERPLBLD CKD-EPI 2021: 94.7 ML/MIN/1.73
EOSINOPHIL # BLD AUTO: 0.07 10*3/MM3 (ref 0–0.4)
EOSINOPHIL NFR BLD AUTO: 1 % (ref 0.3–6.2)
ERYTHROCYTE [DISTWIDTH] IN BLOOD BY AUTOMATED COUNT: 13.2 % (ref 12.3–15.4)
ERYTHROCYTE [SEDIMENTATION RATE] IN BLOOD: 3 MM/HR (ref 0–30)
GLOBULIN UR ELPH-MCNC: 2.5 GM/DL
GLUCOSE SERPL-MCNC: 109 MG/DL (ref 65–99)
GLUCOSE UR STRIP-MCNC: NEGATIVE MG/DL
HCT VFR BLD AUTO: 38.4 % (ref 34–46.6)
HGB BLD-MCNC: 12.9 G/DL (ref 12–15.9)
HGB UR QL STRIP.AUTO: NEGATIVE
HOLD SPECIMEN: NORMAL
HOLD SPECIMEN: NORMAL
IMM GRANULOCYTES # BLD AUTO: 0.02 10*3/MM3 (ref 0–0.05)
IMM GRANULOCYTES NFR BLD AUTO: 0.3 % (ref 0–0.5)
KETONES UR QL STRIP: NEGATIVE
LACTOFERRIN STL QL LA: POSITIVE
LEUKOCYTE ESTERASE UR QL STRIP.AUTO: NEGATIVE
LIPASE SERPL-CCNC: 13 U/L (ref 13–60)
LYMPHOCYTES # BLD AUTO: 1.56 10*3/MM3 (ref 0.7–3.1)
LYMPHOCYTES NFR BLD AUTO: 22 % (ref 19.6–45.3)
MCH RBC QN AUTO: 31.2 PG (ref 26.6–33)
MCHC RBC AUTO-ENTMCNC: 33.6 G/DL (ref 31.5–35.7)
MCV RBC AUTO: 93 FL (ref 79–97)
MONOCYTES # BLD AUTO: 0.91 10*3/MM3 (ref 0.1–0.9)
MONOCYTES NFR BLD AUTO: 12.9 % (ref 5–12)
NEUTROPHILS NFR BLD AUTO: 4.49 10*3/MM3 (ref 1.7–7)
NEUTROPHILS NFR BLD AUTO: 63.4 % (ref 42.7–76)
NITRITE UR QL STRIP: NEGATIVE
NRBC BLD AUTO-RTO: 0 /100 WBC (ref 0–0.2)
PH UR STRIP.AUTO: 6 [PH] (ref 5–8)
PLATELET # BLD AUTO: 256 10*3/MM3 (ref 140–450)
PMV BLD AUTO: 9.9 FL (ref 6–12)
POTASSIUM SERPL-SCNC: 3.2 MMOL/L (ref 3.5–5.2)
PROT SERPL-MCNC: 6 G/DL (ref 6–8.5)
PROT UR QL STRIP: NEGATIVE
RBC # BLD AUTO: 4.13 10*6/MM3 (ref 3.77–5.28)
SODIUM SERPL-SCNC: 137 MMOL/L (ref 136–145)
SP GR UR STRIP: 1.01 (ref 1–1.03)
UROBILINOGEN UR QL STRIP: NORMAL
WBC NRBC COR # BLD AUTO: 7.08 10*3/MM3 (ref 3.4–10.8)
WHOLE BLOOD HOLD COAG: NORMAL
WHOLE BLOOD HOLD SPECIMEN: NORMAL

## 2024-08-06 PROCEDURE — 86140 C-REACTIVE PROTEIN: CPT | Performed by: INTERNAL MEDICINE

## 2024-08-06 PROCEDURE — 25010000002 ONDANSETRON PER 1 MG: Performed by: EMERGENCY MEDICINE

## 2024-08-06 PROCEDURE — 83605 ASSAY OF LACTIC ACID: CPT | Performed by: EMERGENCY MEDICINE

## 2024-08-06 PROCEDURE — 96375 TX/PRO/DX INJ NEW DRUG ADDON: CPT

## 2024-08-06 PROCEDURE — 87507 IADNA-DNA/RNA PROBE TQ 12-25: CPT | Performed by: INTERNAL MEDICINE

## 2024-08-06 PROCEDURE — 25810000003 SODIUM CHLORIDE 0.9 % SOLUTION: Performed by: EMERGENCY MEDICINE

## 2024-08-06 PROCEDURE — 99285 EMERGENCY DEPT VISIT HI MDM: CPT

## 2024-08-06 PROCEDURE — 36415 COLL VENOUS BLD VENIPUNCTURE: CPT

## 2024-08-06 PROCEDURE — 83993 ASSAY FOR CALPROTECTIN FECAL: CPT | Performed by: INTERNAL MEDICINE

## 2024-08-06 PROCEDURE — G0378 HOSPITAL OBSERVATION PER HR: HCPCS

## 2024-08-06 PROCEDURE — 96374 THER/PROPH/DIAG INJ IV PUSH: CPT

## 2024-08-06 PROCEDURE — 87427 SHIGA-LIKE TOXIN AG IA: CPT | Performed by: INTERNAL MEDICINE

## 2024-08-06 PROCEDURE — 25810000003 LACTATED RINGERS PER 1000 ML: Performed by: INTERNAL MEDICINE

## 2024-08-06 PROCEDURE — 87045 FECES CULTURE AEROBIC BACT: CPT | Performed by: INTERNAL MEDICINE

## 2024-08-06 PROCEDURE — 87046 STOOL CULTR AEROBIC BACT EA: CPT | Performed by: INTERNAL MEDICINE

## 2024-08-06 PROCEDURE — 85652 RBC SED RATE AUTOMATED: CPT | Performed by: INTERNAL MEDICINE

## 2024-08-06 PROCEDURE — 83630 LACTOFERRIN FECAL (QUAL): CPT | Performed by: INTERNAL MEDICINE

## 2024-08-06 PROCEDURE — 83690 ASSAY OF LIPASE: CPT | Performed by: EMERGENCY MEDICINE

## 2024-08-06 PROCEDURE — 25010000002 HYDROMORPHONE 1 MG/ML SOLUTION: Performed by: EMERGENCY MEDICINE

## 2024-08-06 PROCEDURE — 99214 OFFICE O/P EST MOD 30 MIN: CPT | Performed by: INTERNAL MEDICINE

## 2024-08-06 PROCEDURE — 85025 COMPLETE CBC W/AUTO DIFF WBC: CPT | Performed by: EMERGENCY MEDICINE

## 2024-08-06 PROCEDURE — 96361 HYDRATE IV INFUSION ADD-ON: CPT

## 2024-08-06 PROCEDURE — 81003 URINALYSIS AUTO W/O SCOPE: CPT | Performed by: EMERGENCY MEDICINE

## 2024-08-06 PROCEDURE — 80053 COMPREHEN METABOLIC PANEL: CPT | Performed by: EMERGENCY MEDICINE

## 2024-08-06 RX ORDER — ONDANSETRON 2 MG/ML
4 INJECTION INTRAMUSCULAR; INTRAVENOUS ONCE
Status: COMPLETED | OUTPATIENT
Start: 2024-08-06 | End: 2024-08-06

## 2024-08-06 RX ORDER — DICYCLOMINE HYDROCHLORIDE 10 MG/1
20 CAPSULE ORAL 4 TIMES DAILY
Status: DISCONTINUED | OUTPATIENT
Start: 2024-08-06 | End: 2024-08-07 | Stop reason: HOSPADM

## 2024-08-06 RX ORDER — PROMETHAZINE HYDROCHLORIDE 12.5 MG/1
12.5 TABLET ORAL EVERY 6 HOURS PRN
COMMUNITY
Start: 2024-08-05

## 2024-08-06 RX ORDER — LOPERAMIDE HYDROCHLORIDE 2 MG/1
4 CAPSULE ORAL 4 TIMES DAILY PRN
Status: DISCONTINUED | OUTPATIENT
Start: 2024-08-06 | End: 2024-08-06

## 2024-08-06 RX ORDER — DICYCLOMINE HYDROCHLORIDE 10 MG/1
10 CAPSULE ORAL 4 TIMES DAILY
Status: DISCONTINUED | OUTPATIENT
Start: 2024-08-06 | End: 2024-08-06

## 2024-08-06 RX ORDER — SODIUM CHLORIDE 0.9 % (FLUSH) 0.9 %
10 SYRINGE (ML) INJECTION AS NEEDED
Status: DISCONTINUED | OUTPATIENT
Start: 2024-08-06 | End: 2024-08-07 | Stop reason: HOSPADM

## 2024-08-06 RX ORDER — ONDANSETRON 2 MG/ML
4 INJECTION INTRAMUSCULAR; INTRAVENOUS EVERY 6 HOURS PRN
Status: DISCONTINUED | OUTPATIENT
Start: 2024-08-06 | End: 2024-08-07 | Stop reason: HOSPADM

## 2024-08-06 RX ORDER — SODIUM CHLORIDE, SODIUM LACTATE, POTASSIUM CHLORIDE, CALCIUM CHLORIDE 600; 310; 30; 20 MG/100ML; MG/100ML; MG/100ML; MG/100ML
125 INJECTION, SOLUTION INTRAVENOUS CONTINUOUS
Status: DISCONTINUED | OUTPATIENT
Start: 2024-08-06 | End: 2024-08-07 | Stop reason: HOSPADM

## 2024-08-06 RX ORDER — PAROXETINE HYDROCHLORIDE 20 MG/1
10 TABLET, FILM COATED ORAL NIGHTLY
Status: DISCONTINUED | OUTPATIENT
Start: 2024-08-06 | End: 2024-08-07 | Stop reason: HOSPADM

## 2024-08-06 RX ORDER — FLUTICASONE PROPIONATE 50 MCG
2 SPRAY, SUSPENSION (ML) NASAL DAILY
Status: DISCONTINUED | OUTPATIENT
Start: 2024-08-06 | End: 2024-08-07 | Stop reason: HOSPADM

## 2024-08-06 RX ORDER — FAMOTIDINE 10 MG/ML
20 INJECTION, SOLUTION INTRAVENOUS ONCE
Status: COMPLETED | OUTPATIENT
Start: 2024-08-06 | End: 2024-08-06

## 2024-08-06 RX ADMIN — Medication 10 ML: at 21:38

## 2024-08-06 RX ADMIN — HYDROMORPHONE HYDROCHLORIDE 0.25 MG: 1 INJECTION, SOLUTION INTRAMUSCULAR; INTRAVENOUS; SUBCUTANEOUS at 13:19

## 2024-08-06 RX ADMIN — FAMOTIDINE 20 MG: 10 INJECTION INTRAVENOUS at 13:21

## 2024-08-06 RX ADMIN — ONDANSETRON 4 MG: 2 INJECTION INTRAMUSCULAR; INTRAVENOUS at 13:16

## 2024-08-06 RX ADMIN — RIFAXIMIN 550 MG: 550 TABLET ORAL at 21:38

## 2024-08-06 RX ADMIN — SODIUM CHLORIDE 1000 ML: 9 INJECTION, SOLUTION INTRAVENOUS at 13:16

## 2024-08-06 RX ADMIN — SODIUM CHLORIDE, POTASSIUM CHLORIDE, SODIUM LACTATE AND CALCIUM CHLORIDE 125 ML/HR: 600; 310; 30; 20 INJECTION, SOLUTION INTRAVENOUS at 16:58

## 2024-08-06 RX ADMIN — PAROXETINE HYDROCHLORIDE 10 MG: 20 TABLET, FILM COATED ORAL at 21:38

## 2024-08-06 RX ADMIN — DICYCLOMINE HYDROCHLORIDE 20 MG: 10 CAPSULE ORAL at 21:38

## 2024-08-06 NOTE — H&P
[facility]   HISTORY AND PHYSICAL    Patient Name: Ivory Nava  : 1955  MRN: [id number]  Primary Care Physician:  Darrian Oreilly MD  Date of admission: 2024    Subjective   Subjective     Chief Complaint:   Diarrhea      HPI:    Ivory Nava is a 68 y.o. female with history of cervical cancer post radiation several years ago, comes in with abdominal pain and diarrhea, patient also has episodes of nausea and vomiting.  Patient reports she has diarrhea since she had radiation for her cervical cancer.  She is incontinent of stools now.  Denies any fever chills        Review of Systems:    Fatigue and weakness  Anxiety  No chest pain  Abdominal pain and cramps  Diarrhea  No blood in the stools      Personal History     Past Medical History:   Diagnosis Date    Anxiety     Brain tumor (benign)     Cervical cancer     GERD (gastroesophageal reflux disease)     History of chemotherapy     History of radiation therapy     Seasonal allergies        Past Surgical History:   Procedure Laterality Date    BRAIN TUMOR EXCISION      COLONOSCOPY N/A 2022    Procedure: COLONOSCOPY WITH POLYPECTOMY/SNARE/BIOPSIES;  Surgeon: Darrian Almanzar MD;  Location: [facility] ENDOSCOPY;  Service: Gastroenterology;  Laterality: N/A;  COLON POLYP  PROCTITIS    SIGMOIDOSCOPY N/A 2024    Procedure: COLONOSCOPY WITH BIOPSIES AND POLYPECTOMY;  Surgeon: Darrian Almanzar MD;  Location: [facility] ENDOSCOPY;  Service: Gastroenterology;  Laterality: N/A;  COLON POLYP, DIVERTICULOSIS       Family History: family history includes Cancer in her mother. Otherwise pertinent FHx was reviewed and not pertinent to current issue.    Social History:  reports that she has been smoking cigarettes. She has a 40 pack-year smoking history. She has never used smokeless tobacco. She reports that she does not currently use alcohol. She reports that she does not use drugs.    Home Medications:  PARoxetine, dicyclomine, loratadine,  multivitamin with minerals, ondansetron ODT, and promethazine      Allergies:  Allergies   Allergen Reactions    Azithromycin Other (See Comments)     tonung pilling and swelling     Penicillins Anaphylaxis    Lorazepam Mental Status Change and Unknown - High Severity     Suicidal    Sulfa Antibiotics Nausea Only and Unknown - High Severity       Objective   Objective     Vitals:   Temp:  [98.8 °F (37.1 °C)] 98.8 °F (37.1 °C)  Heart Rate:  [70-85] 73  Resp:  [16-18] 16  BP: (121-146)/(56-68) 126/65    Physical Exam    Elderly female looks of her stated age, anxious.  Heart regular.  Lungs clear.  Abdomen tender over the left lower quadrant.  No guarding or rebound.  Bowel sounds present extremities no edema      I have personally reviewed the results from the time of this admission to 8/6/2024 17:17 EDT and agree with these findings:  [x]  Laboratory  []  Microbiology  []  Radiology  []  EKG/Telemetry   []  Cardiology/Vascular   []  Pathology  []  Old records  []  Other:    CBC:    WBC   Date Value Ref Range Status   08/06/2024 7.08 3.40 - 10.80 10*3/mm3 Final     RBC   Date Value Ref Range Status   08/06/2024 4.13 3.77 - 5.28 10*6/mm3 Final     Hemoglobin   Date Value Ref Range Status   08/06/2024 12.9 12.0 - 15.9 g/dL Final     Hematocrit   Date Value Ref Range Status   08/06/2024 38.4 34.0 - 46.6 % Final     MCV   Date Value Ref Range Status   08/06/2024 93.0 79.0 - 97.0 fL Final     MCH   Date Value Ref Range Status   08/06/2024 31.2 26.6 - 33.0 pg Final     MCHC   Date Value Ref Range Status   08/06/2024 33.6 31.5 - 35.7 g/dL Final     RDW   Date Value Ref Range Status   08/06/2024 13.2 12.3 - 15.4 % Final     RDW-SD   Date Value Ref Range Status   08/06/2024 44.9 37.0 - 54.0 fl Final     MPV   Date Value Ref Range Status   08/06/2024 9.9 6.0 - 12.0 fL Final     Platelets   Date Value Ref Range Status   08/06/2024 256 140 - 450 10*3/mm3 Final     Neutrophil %   Date Value Ref Range Status   08/06/2024 63.4 42.7  - 76.0 % Final     Lymphocyte %   Date Value Ref Range Status   08/06/2024 22.0 19.6 - 45.3 % Final     Monocyte %   Date Value Ref Range Status   08/06/2024 12.9 (H) 5.0 - 12.0 % Final     Eosinophil %   Date Value Ref Range Status   08/06/2024 1.0 0.3 - 6.2 % Final     Basophil %   Date Value Ref Range Status   08/06/2024 0.4 0.0 - 1.5 % Final     Immature Grans %   Date Value Ref Range Status   08/06/2024 0.3 0.0 - 0.5 % Final     Neutrophils, Absolute   Date Value Ref Range Status   08/06/2024 4.49 1.70 - 7.00 10*3/mm3 Final     Lymphocytes, Absolute   Date Value Ref Range Status   08/06/2024 1.56 0.70 - 3.10 10*3/mm3 Final     Monocytes, Absolute   Date Value Ref Range Status   08/06/2024 0.91 (H) 0.10 - 0.90 10*3/mm3 Final     Eosinophils, Absolute   Date Value Ref Range Status   08/06/2024 0.07 0.00 - 0.40 10*3/mm3 Final     Basophils, Absolute   Date Value Ref Range Status   08/06/2024 0.03 0.00 - 0.20 10*3/mm3 Final     Immature Grans, Absolute   Date Value Ref Range Status   08/06/2024 0.02 0.00 - 0.05 10*3/mm3 Final     nRBC   Date Value Ref Range Status   08/06/2024 0.0 0.0 - 0.2 /100 WBC Final        BMP:    Lab Results   Component Value Date    GLUCOSE 109 (H) 08/06/2024    BUN 10 08/06/2024    CREATININE 0.67 08/06/2024    BCR 14.9 08/06/2024    K 3.2 (L) 08/06/2024    CO2 25.5 08/06/2024    CALCIUM 8.6 08/06/2024    ALBUMIN 3.5 08/06/2024    LABIL2 1.5 09/25/2020    AST 18 08/06/2024    ALT 11 08/06/2024        No radiology results for the last day           Assessment & Plan   Assessment / Plan       Current Diagnosis:  Active Hospital Problems    Diagnosis     **Acute gastroenteritis     Hypokalemia      Plan:   Patient with gastroenteritis symptoms but also has chronic diarrhea.  Supportive care, IV fluids, liquid diet, Bentyl as needed.  Will add Colestid.  Imodium as needed.  Further management with the clinical course.      VTE Prophylaxis:  No VTE prophylaxis order currently exists.        GI  Prophylaxis:       Pepcid    CODE STATUS:       Admission Status:  I believe this patient meets observation status.             I have dictated this note utilizing Dragon Dictation.             Please note that portions of this note were completed with a voice recognition program.             Part of this note may be an electronic transcription/translation of spoken language to printed text         using the Dragon Dictation System.       Electronically signed by Sanford Christensen MD, 08/06/24, 5:15 PM EDT.    Total time spent with in evaluation and management:

## 2024-08-06 NOTE — ED PROVIDER NOTES
"Time: 12:31 PM EDT  Date of encounter:  8/6/2024  Independent Historian/Clinical History and Information was obtained by:   Patient    History is limited by: N/A    Chief Complaint: Abdominal pain      History of Present Illness:  Patient is a 68 y.o. year old female who presents to the emergency department for evaluation of abdominal pain.  Patient was seen here proximately 5 days ago for the same.  Reports persistent pain since then.  Saw her primary care provider yesterday but states her pain has been constant with nausea and vomiting as recently as yesterday.  Persistent diarrhea.  Denies any GI bleeding.  Describes her discomfort as epigastric cramping like \"labor pain\".  Also complains of heavy acid reflux/indigestion.      Patient Care Team  Primary Care Provider: Darrian Oreilly MD    Past Medical History:     Allergies   Allergen Reactions    Azithromycin Other (See Comments)     tonung pilling and swelling     Penicillins Anaphylaxis    Lorazepam Mental Status Change and Unknown - High Severity     Suicidal    Sulfa Antibiotics Nausea Only and Unknown - High Severity     Past Medical History:   Diagnosis Date    Anxiety     Brain tumor (benign)     Cervical cancer     GERD (gastroesophageal reflux disease)     History of chemotherapy     History of radiation therapy     Seasonal allergies      Past Surgical History:   Procedure Laterality Date    BRAIN TUMOR EXCISION      COLONOSCOPY N/A 7/12/2022    Procedure: COLONOSCOPY WITH POLYPECTOMY/SNARE/BIOPSIES;  Surgeon: Darrian Almanzar MD;  Location: [facility] ENDOSCOPY;  Service: Gastroenterology;  Laterality: N/A;  COLON POLYP  PROCTITIS    SIGMOIDOSCOPY N/A 6/28/2024    Procedure: COLONOSCOPY WITH BIOPSIES AND POLYPECTOMY;  Surgeon: Darrian Almanzar MD;  Location: [facility] ENDOSCOPY;  Service: Gastroenterology;  Laterality: N/A;  COLON POLYP, DIVERTICULOSIS     Family History   Problem Relation Age of Onset    Cancer Mother     Colon cancer Neg Hx     " Malig Hyperthermia Neg Hx        Home Medications:  Prior to Admission medications    Medication Sig Start Date End Date Taking? Authorizing Provider   colestipol (COLESTID) 5 g packet Take half packet every night at bedtime.  Hold if no bowel movement for 24 hours 7/5/24   Brittani Oseguera APRN   dicyclomine (BENTYL) 20 MG tablet Take 1 tablet by mouth Every 6 (Six) Hours for 7 days. 8/2/24 8/9/24  Kenn Macias DO   fluticasone (FLONASE) 50 MCG/ACT nasal spray 2 sprays into the nostril(s) as directed by provider Daily. 1/10/24   Mary Rodriguez APRN   HYDROcodone-acetaminophen (NORCO) 5-325 MG per tablet Take 1 tablet by mouth Every 4 (Four) Hours As Needed for Moderate Pain for up to 3 days. 8/2/24 8/5/24  Kenn Macias DO   Loratadine (Claritin) 10 MG capsule Take  by mouth.    ProviderEvelia MD   multivitamin with minerals tablet tablet Take 1 tablet by mouth Daily.    Evelia Castro MD   ondansetron ODT (ZOFRAN-ODT) 4 MG disintegrating tablet Place 2 tablets on the tongue Every 8 (Eight) Hours As Needed for Nausea or Vomiting for up to 5 days. 8/2/24 8/7/24  Kenn Macias DO   PARoxetine (PAXIL) 20 MG tablet Take 0.5 tablets by mouth Every Night. 1/10/22   Evelia Castro MD        Social History:   Social History     Tobacco Use    Smoking status: Every Day     Current packs/day: 1.00     Average packs/day: 1 pack/day for 40.0 years (40.0 ttl pk-yrs)     Types: Cigarettes    Smokeless tobacco: Never   Vaping Use    Vaping status: Never Used   Substance Use Topics    Alcohol use: Not Currently    Drug use: Never         Review of Systems:  Review of Systems   Constitutional:  Negative for chills and fever.   HENT:  Negative for congestion, rhinorrhea and sore throat.    Eyes:  Negative for photophobia.   Respiratory:  Negative for apnea, cough, chest tightness and shortness of breath.    Cardiovascular:  Negative for chest pain and palpitations.   Gastrointestinal:   "Positive for abdominal pain, diarrhea, nausea and vomiting.   Endocrine: Negative.    Genitourinary:  Negative for difficulty urinating and dysuria.   Musculoskeletal:  Negative for back pain, joint swelling and myalgias.   Skin:  Negative for color change and wound.   Allergic/Immunologic: Negative.    Neurological:  Negative for seizures and headaches.   Psychiatric/Behavioral: Negative.     All other systems reviewed and are negative.       Physical Exam:  /65 (BP Location: Right arm, Patient Position: Sitting)   Pulse 73   Temp 98.8 °F (37.1 °C)   Resp 16   Ht 165.1 cm (65\")   Wt 55.5 kg (122 lb 5.7 oz)   SpO2 97%   BMI 20.36 kg/m²     Physical Exam  Vitals and nursing note reviewed.   Constitutional:       General: She is awake.      Appearance: Normal appearance. She is well-developed.   HENT:      Head: Normocephalic and atraumatic.      Nose: Nose normal.      Mouth/Throat:      Mouth: Mucous membranes are moist.   Eyes:      Extraocular Movements: Extraocular movements intact.      Pupils: Pupils are equal, round, and reactive to light.   Cardiovascular:      Rate and Rhythm: Normal rate and regular rhythm.      Heart sounds: Normal heart sounds.   Pulmonary:      Effort: Pulmonary effort is normal. No respiratory distress.      Breath sounds: Normal breath sounds. No wheezing, rhonchi or rales.   Abdominal:      General: Bowel sounds are normal.      Palpations: Abdomen is soft.      Tenderness: There is generalized abdominal tenderness. There is no guarding or rebound.      Comments: No rigidity   Musculoskeletal:         General: No tenderness. Normal range of motion.      Cervical back: Normal range of motion and neck supple.   Skin:     General: Skin is warm and dry.      Coloration: Skin is not jaundiced.   Neurological:      General: No focal deficit present.      Mental Status: She is alert. Mental status is at baseline.   Psychiatric:         Mood and Affect: Mood normal.            "       Procedures:  Procedures      Medical Decision Making:      Comorbidities that affect care:    Brain tumor, anxiety, GERD, cervical cancer    External Notes reviewed:    Previous Clinic Note: Visit with Dr. Darrian Oreilly yesterday.  Description: Abdominal pain, diarrhea      The following orders were placed and all results were independently analyzed by me:  Orders Placed This Encounter   Procedures    Stool Culture (Reference Lab) - Stool, Per Rectum    Gastrointestinal Panel, PCR - Stool, Per Rectum    [place] Draw    Comprehensive Metabolic Panel    Lipase    Urinalysis With Microscopic If Indicated (No Culture) - Urine, Clean Catch    Lactic Acid, Plasma    CBC Auto Differential    Fecal Lactoferrin Qual. - Stool, Per Rectum    Calprotectin, Fecal - Stool, Per Rectum    Sedimentation Rate    C-reactive Protein    Diet: Liquid; Clear Liquid; Fluid Consistency: Thin (IDDSI 0)    Undress & Gown    Inpatient Gastroenterology Consult    Inpatient Gastroenterology Consult    Wound Ostomy Eval & Treat    Insert Peripheral IV    Initiate Observation Status    CBC & Differential    Green Top (Gel)    Lavender Top    Gold Top - SST    Light Blue Top       Medications Given in the Emergency Department:  Medications   sodium chloride 0.9 % flush 10 mL (has no administration in time range)   fluticasone (FLONASE) 50 MCG/ACT nasal spray 2 spray (2 sprays Nasal Not Given 8/6/24 1758)   PARoxetine (PAXIL) tablet 10 mg (has no administration in time range)   lactated ringers infusion (125 mL/hr Intravenous New Bag 8/6/24 1658)   ondansetron (ZOFRAN) injection 4 mg (has no administration in time range)   HYDROmorphone (DILAUDID) injection 0.25 mg (has no administration in time range)   loperamide (IMODIUM) capsule 4 mg (has no administration in time range)   dicyclomine (BENTYL) capsule 20 mg (has no administration in time range)   sodium chloride 0.9 % bolus 1,000 mL (0 mL Intravenous Stopped 8/6/24 1456)   famotidine  (PEPCID) injection 20 mg (20 mg Intravenous Given 8/6/24 1321)   ondansetron (ZOFRAN) injection 4 mg (4 mg Intravenous Given 8/6/24 1316)   HYDROmorphone (DILAUDID) injection 0.25 mg (0.25 mg Intravenous Given 8/6/24 1319)        ED Course:         Labs:    Lab Results (last 24 hours)       Procedure Component Value Units Date/Time    CBC & Differential [[id number]]  (Abnormal) Collected: 08/06/24 1207    Specimen: Blood from Hand, Left Updated: 08/06/24 1215    Narrative:      The following orders were created for panel order CBC & Differential.  Procedure                               Abnormality         Status                     ---------                               -----------         ------                     CBC Auto Differential[[id number]]        Abnormal            Final result                 Please view results for these tests on the individual orders.    Comprehensive Metabolic Panel [[id number]]  (Abnormal) Collected: 08/06/24 1207    Specimen: Blood from Hand, Left Updated: 08/06/24 1236     Glucose 109 mg/dL      BUN 10 mg/dL      Creatinine 0.67 mg/dL      Sodium 137 mmol/L      Potassium 3.2 mmol/L      Chloride 101 mmol/L      CO2 25.5 mmol/L      Calcium 8.6 mg/dL      Total Protein 6.0 g/dL      Albumin 3.5 g/dL      ALT (SGPT) 11 U/L      AST (SGOT) 18 U/L      Alkaline Phosphatase 70 U/L      Total Bilirubin 0.4 mg/dL      Globulin 2.5 gm/dL      A/G Ratio 1.4 g/dL      BUN/Creatinine Ratio 14.9     Anion Gap 10.5 mmol/L      eGFR 94.7 mL/min/1.73     Narrative:      GFR Normal >60  Chronic Kidney Disease <60  Kidney Failure <15      Lipase [[id number]]  (Normal) Collected: 08/06/24 1207    Specimen: Blood from Hand, Left Updated: 08/06/24 1236     Lipase 13 U/L     Lactic Acid, Plasma [[id number]]  (Normal) Collected: 08/06/24 1207    Specimen: Blood from Hand, Left Updated: 08/06/24 1231     Lactate 1.1 mmol/L     CBC Auto Differential [[id number]]  (Abnormal) Collected: 08/06/24 1207     Specimen: Blood from Hand, Left Updated: 08/06/24 1215     WBC 7.08 10*3/mm3      RBC 4.13 10*6/mm3      Hemoglobin 12.9 g/dL      Hematocrit 38.4 %      MCV 93.0 fL      MCH 31.2 pg      MCHC 33.6 g/dL      RDW 13.2 %      RDW-SD 44.9 fl      MPV 9.9 fL      Platelets 256 10*3/mm3      Neutrophil % 63.4 %      Lymphocyte % 22.0 %      Monocyte % 12.9 %      Eosinophil % 1.0 %      Basophil % 0.4 %      Immature Grans % 0.3 %      Neutrophils, Absolute 4.49 10*3/mm3      Lymphocytes, Absolute 1.56 10*3/mm3      Monocytes, Absolute 0.91 10*3/mm3      Eosinophils, Absolute 0.07 10*3/mm3      Basophils, Absolute 0.03 10*3/mm3      Immature Grans, Absolute 0.02 10*3/mm3      nRBC 0.0 /100 WBC     Urinalysis With Microscopic If Indicated (No Culture) - Urine, Clean Catch [[id number]]  (Normal) Collected: 08/06/24 1309    Specimen: Urine, Clean Catch Updated: 08/06/24 1333     Color, UA Yellow     Appearance, UA Clear     pH, UA 6.0     Specific Gravity, UA 1.008     Glucose, UA Negative     Ketones, UA Negative     Bilirubin, UA Negative     Blood, UA Negative     Protein, UA Negative     Leuk Esterase, UA Negative     Nitrite, UA Negative     Urobilinogen, UA 0.2 E.U./dL    Narrative:      Urine microscopic not indicated.    Calprotectin, Fecal - Stool, Per Rectum [[id number]] Updated: 08/06/24 1821    Specimen: Stool from Per Rectum     Gastrointestinal Panel, PCR - Stool, Per Rectum [[id number]] Collected: 08/06/24 1739    Specimen: Stool from Per Rectum Updated: 08/06/24 1749    Fecal Lactoferrin Qual. - Stool, Per Rectum [[id number]]  (Abnormal) Collected: 08/06/24 1739    Specimen: Stool from Per Rectum Updated: 08/06/24 1823     Lactoferrin, Qual Positive    Sedimentation Rate [[id number]]  (Normal) Collected: 08/06/24 1750    Specimen: Blood from Arm, Right Updated: 08/06/24 1804     Sed Rate 3 mm/hr     C-reactive Protein [[id number]]  (Abnormal) Collected: 08/06/24 2903    Specimen: Blood from Arm, Right Updated:  08/06/24 1824     C-Reactive Protein 4.83 mg/dL     Stool Culture (Reference Lab) - Stool, Per Rectum [[id number]] Collected: 08/06/24 1757    Specimen: Stool from Per Rectum Updated: 08/06/24 1757             Imaging:    No Radiology Exams Resulted Within Past 24 Hours      Differential Diagnosis and Discussion:    Abdominal Pain: Based on the patient's signs and symptoms, I considered abdominal aortic aneurysm, small bowel obstruction, pancreatitis, acute cholecystitis, acute appendecitis, peptic ulcer disease, gastritis, colitis, endocrine disorders, irritable bowel syndrome and other differential diagnosis an etiology of the patient's abdominal pain.    All labs were reviewed and interpreted by me.    MDM     Amount and/or Complexity of Data Reviewed  Clinical lab tests: reviewed                 Patient Care Considerations:    SEPSIS was considered but is NOT present in the emergency department as SIRS criteria is not present.      Consultants/Shared Management Plan:    Hospitalist: I have discussed the case with Dr. Oreilly who agrees to accept the patient for admission.    Social Determinants of Health:    Patient is independent, reliable, and has access to care.       Disposition and Care Coordination:    Admit:   Through independent evaluation of the patient's history, physical, and imperical data, the patient meets criteria for inpatient admission to the hospital.        Final diagnoses:   Abdominal pain, unspecified abdominal location        ED Disposition       ED Disposition   Decision to Admit    Condition   --    Comment   Level of Care: Med/Surg [1]   Diagnosis: Acute gastroenteritis [679262]   Admitting Physician: LUCIAN ESPINAL [[id number]]   Attending Physician: LUCIAN ESPINAL [[id number]]                 This medical record created using voice recognition software.             Ector Isaac MD  08/06/24 3435

## 2024-08-06 NOTE — PLAN OF CARE
Goal Outcome Evaluation:  Plan of Care Reviewed With: patient        Progress: no change  Outcome Evaluation: Patient was admitted to the floor this shift. Continous fluids started. Patient has had multiple liquid bowel movements with some incontinence. VSS.

## 2024-08-06 NOTE — CONSULTS
[facility]  Initial Inpatient Consult Note    Referring Provider: Sanford Christensen MD    Reason for Consultation: Acute on chronic diarrhea    History of present illness:  Patient is 68 y.o. female with known medical history of cervical cancer diagnosed in 2008 s/p chemoradiation therapy admitted with acute onset of abdominal pain with diarrhea.      Patient describes that diarrhea with abdominal pain brought her to the ED on 08/01/2024 after eating hamburger at a local restaurant.  She came to the ER and found to have enterocolitis on CT scan with significant stenosis of the superior mesenteric artery.  She was discharged home with instructions to continue over-the-counter loperamide.  Patient again presented in the ED earlier today with complaint of liver like crampy abdominal pain with liquidy diarrhea with urgency and incontinence.  Patient mentions that unable to keep down anything except clear liquids.      Patient describes that she has chronic diarrhea since 2008 after chemoradiation therapy and she was told that radiation caused scarring in the rectum.  She normally takes over-the-counter loperamide and go several times daily.  She denies abdominal pain associated with diarrhea.  No blood in stool    Patient had last colonoscopy on 06/28/2024 and at that time biopsies has rule out for microscopic colitis.  Rectal biopsies were consistent with focal mucosal fibrosis.    Past Medical History:  Past Medical History:   Diagnosis Date    Anxiety     Brain tumor (benign)     Cervical cancer     GERD (gastroesophageal reflux disease)     History of chemotherapy     History of radiation therapy     Seasonal allergies      Past Surgical History:  Past Surgical History:   Procedure Laterality Date    BRAIN TUMOR EXCISION      COLONOSCOPY N/A 7/12/2022    Procedure: COLONOSCOPY WITH POLYPECTOMY/SNARE/BIOPSIES;  Surgeon: Darrian Almanzar MD;  Location: [facility] ENDOSCOPY;  Service:  Gastroenterology;  Laterality: N/A;  COLON POLYP  PROCTITIS    SIGMOIDOSCOPY N/A 6/28/2024    Procedure: COLONOSCOPY WITH BIOPSIES AND POLYPECTOMY;  Surgeon: Darrian Almanzar MD;  Location: [facility] ENDOSCOPY;  Service: Gastroenterology;  Laterality: N/A;  COLON POLYP, DIVERTICULOSIS      Social History:   Social History     Tobacco Use    Smoking status: Every Day     Current packs/day: 1.00     Average packs/day: 1 pack/day for 40.0 years (40.0 ttl pk-yrs)     Types: Cigarettes    Smokeless tobacco: Never   Substance Use Topics    Alcohol use: Not Currently      Family History:  Family History   Problem Relation Age of Onset    Cancer Mother     Colon cancer Neg Hx     Malig Hyperthermia Neg Hx      Home Meds:  Medications Prior to Admission   Medication Sig Dispense Refill Last Dose    dicyclomine (BENTYL) 20 MG tablet Take 1 tablet by mouth Every 6 (Six) Hours for 7 days. 28 tablet 0     loratadine (CLARITIN) 10 MG tablet Take 1 tablet by mouth Daily.   Past Week    multivitamin with minerals tablet tablet Take 1 tablet by mouth Daily.   Past Week    ondansetron ODT (ZOFRAN-ODT) 4 MG disintegrating tablet Place 2 tablets on the tongue Every 8 (Eight) Hours As Needed for Nausea or Vomiting for up to 5 days. 15 tablet 0     PARoxetine (PAXIL) 20 MG tablet Take 1 tablet by mouth Every Night.   8/5/2024    promethazine (PHENERGAN) 12.5 MG tablet Take 1 tablet by mouth Every 6 (Six) Hours As Needed.        Current Meds:   dicyclomine, 20 mg, Oral, 4x Daily  fluticasone, 2 spray, Nasal, Daily  PARoxetine, 10 mg, Oral, Nightly      Allergies:  Allergies   Allergen Reactions    Azithromycin Other (See Comments)     tonung pilling and swelling     Penicillins Anaphylaxis    Lorazepam Mental Status Change and Unknown - High Severity     Suicidal    Sulfa Antibiotics Nausea Only and Unknown - High Severity       Objective     Vital Signs  Temp:  [98.8 °F (37.1 °C)] 98.8 °F (37.1 °C)  Heart Rate:  [70-85] 73  Resp:   [16-18] 16  BP: (121-146)/(56-68) 126/65  Physical Exam:  General Appearance:    Alert and oriented, normal affect   Head:    Normocephalic, without obvious abnormality, atraumatic   Eyes:          conjunctivae and sclerae normal, no icterus, pupils round and reactive to light   Oral cavity: Moist and intact, normal dentation   Neck:   Supple, no adenopathy   Chest Wall/Lungs:    No abnormalities observed, equal on expansion bilaterally, no use of extrarespiratory muscles noted   Abdomen:     Soft, nondistended, nontender; normal bowel sounds, no hepatospleenomegaly, no ascites   Extremities:   no edema, clubbing, or cyanosis   Skin:   No bruising or rash   Psychiatric:  normal mood and insight     Results Review:   I reviewed the patient's new clinical results.    Results from last 7 days   Lab Units 08/06/24  1207 08/02/24  0002   WBC 10*3/mm3 7.08 11.32*   HEMOGLOBIN g/dL 12.9 13.9   MCV fL 93.0 94.0   PLATELETS 10*3/mm3 256 281         Lab 08/06/24  1207 08/02/24  0002   NEUTROS ABS 4.49 8.79*     Results from last 7 days   Lab Units 08/06/24  1207 08/02/24  0002   BUN mg/dL 10 13   CREATININE mg/dL 0.67 0.88   SODIUM mmol/L 137 140   POTASSIUM mmol/L 3.2* 4.2   CHLORIDE mmol/L 101 103   CO2 mmol/L 25.5 27.3   GLUCOSE mg/dL 109* 115*          Results from last 7 days   Lab Units 08/06/24  1207 08/02/24  0002   AST (SGOT) U/L 18 17   ALT (SGPT) U/L 11 9   ALK PHOS U/L 70 89   BILIRUBIN mg/dL 0.4 0.5   TOTAL PROTEIN g/dL 6.0 6.7   ALBUMIN g/dL 3.5 3.9                Radiology:  CT Abdomen Pelvis With Contrast    Result Date: 8/2/2024  There is possible age-indeterminate infectious/inflammatory enterocolitis. An associated mechanical small bowel obstruction cannot be excluded, as discussed. No pneumoperitoneum or pneumatosis. No portal or mesenteric venous gas. Please see above comments for further detail.   Please note that portions of this note were completed with a voice recognition program.   Electronically  Signed By-Roberto Langston MD On:8/2/2024 3:03 AM      US Gallbladder    Result Date: 8/2/2024  At least one gallstone is seen without definite acute cholecystitis by ultrasound examination. Gallbladder polyps are also suspected. No biliary ductal dilatation is seen. The other findings are as discussed above.   Please note that portions of this note were completed with a voice recognition program.       Electronically Signed ByGina Langston MD On:8/2/2024 2:09 AM       Impression:     Acute gastroenteritis    Hypokalemia     Impression:    Acute on chronic diarrhea  Abdominal pain    Plan and Recommendations: Patient with 5 days history of abdominal pain associated with acute nonbloody, nonmucoid liquidy diarrhea most probably due to food poisoning with Hemberger that causes enterocolitis as documented on CT scan on 08/02/2024.  Will send GI panel PCR, ESR, CRP, fecal lactoferrin and fecal calprotectin.  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  Patient has been ruled out for IBD versus microscopic versus collagenous versus eosinophilic colitis.  If above-mentioned lab workup came back negative, will treat her symptomatically with low FODMAP diet and cholestyramine/mesalamine      I discussed the patients findings and my recommendations with patient, family, nursing staff, and primary care team.      Electronically signed by Nolan Hernandez MD, 08/06/24, 5:36 PM EDT.

## 2024-08-06 NOTE — CASE MANAGEMENT/SOCIAL WORK
Discharge Planning Assessment   [place]     Patient Name: Ivory Nava  MRN: [id number]  Today's Date: 8/6/2024    Admit Date: 8/6/2024        Discharge Needs Assessment       Row Name 08/06/24 1337       Living Environment    People in Home significant other (P)     Current Living Arrangements home (P)     Potentially Unsafe Housing Conditions none (P)     In the past 12 months has the electric, gas, oil, or water company threatened to shut off services in your home? No (P)     Primary Care Provided by self (P)     Provides Primary Care For no one (P)     Able to Return to Prior Arrangements yes (P)        Resource/Environmental Concerns    Resource/Environmental Concerns none (P)     Transportation Concerns none (P)        Transportation Needs    In the past 12 months, has lack of transportation kept you from medical appointments or from getting medications? no (P)     In the past 12 months, has lack of transportation kept you from meetings, work, or from getting things needed for daily living? No (P)        Food Insecurity    Within the past 12 months, you worried that your food would run out before you got the money to buy more. Never true (P)     Within the past 12 months, the food you bought just didn't last and you didn't have money to get more. Never true (P)        Transition Planning    Patient/Family Anticipates Transition to home (P)     Patient/Family Anticipated Services at Transition none (P)     Transportation Anticipated family or friend will provide (P)        Discharge Needs Assessment    Readmission Within the Last 30 Days no previous admission in last 30 days (P)     Equipment Currently Used at Home none (P)     Concerns to be Addressed no discharge needs identified;denies needs/concerns at this time (P)     Equipment Needed After Discharge none (P)                    Discharge Plan    No documentation.                 Continued Care and Services - Admitted Since 8/6/2024    No active  coordination exists for this encounter.          Demographic Summary       Row Name 08/06/24 1335       General Information    Admission Type observation (P)     Arrived From home (P)     Referral Source emergency department (P)     Reason for Consult discharge planning (P)     Preferred Language English (P)                    Functional Status       Row Name 08/06/24 1336       Functional Status    Usual Activity Tolerance moderate (P)     Current Activity Tolerance moderate (P)        Physical Activity    On average, how many days per week do you engage in moderate to strenuous exercise (like a brisk walk)? 0 days (P)     On average, how many minutes do you engage in exercise at this level? 0 min (P)     Number of minutes of exercise per week 0 (P)        Assessment of Health Literacy    How often do you have someone help you read hospital materials? Sometimes (P)     How often do you have problems learning about your medical condition because of difficulty understanding written information? Sometimes (P)     How often do you have a problem understanding what is told to you about your medical condition? Sometimes (P)     How confident are you filling out medical forms by yourself? Somewhat (P)     Health Literacy Moderate (P)        Functional Status, IADL    Medications independent (P)     Meal Preparation independent (P)     Housekeeping independent (P)     Laundry independent (P)     Shopping independent (P)        Mental Status    General Appearance WDL WDL (P)        Mental Status Summary    Recent Changes in Mental Status/Cognitive Functioning no changes (P)                    Psychosocial    No documentation.                  Abuse/Neglect       Row Name 08/06/24 5544       Personal Safety    Feels Unsafe at Home or Work/School no (P)     Feels Threatened by Someone no (P)     Does Anyone Try to Keep You From Having Contact with Others or Doing Things Outside Your Home? no (P)     Physical Signs of Abuse  Present no (P)                    Legal       Row Name 08/06/24 4807       Financial Resource Strain    How hard is it for you to pay for the very basics like food, housing, medical care, and heating? Not very (P)                    Substance Abuse    No documentation.                  Patient Forms    No documentation.                 SW student met with pt bedside in ED. Pt lives at home with her boyfriend. Pt denies concerns for transportation, access to food, and paying bills. Pt provides primary care for herself. Pt feels safe at home. Pt retired several years back. Pt is not a . Pt denies alcohol and substance use. Pt smokes a pack a day of cigarettes. 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 Pt walks her dogs sometimes for exercise. Pt stated she is prescribed Paxel for anxiety. Pt shared about a previous  being murdered in her backyard and that her mother passed away shortly after that causing her some anxiety. Pt did not identify any discharge needs at this time.   Aline Ortega, Social Work Student

## 2024-08-07 ENCOUNTER — READMISSION MANAGEMENT (OUTPATIENT)
Dept: CALL CENTER | Facility: HOSPITAL | Age: 69
End: 2024-08-07
Payer: MEDICARE

## 2024-08-07 VITALS
SYSTOLIC BLOOD PRESSURE: 122 MMHG | TEMPERATURE: 98.1 F | WEIGHT: 122.36 LBS | BODY MASS INDEX: 20.39 KG/M2 | OXYGEN SATURATION: 93 % | HEIGHT: 65 IN | DIASTOLIC BLOOD PRESSURE: 55 MMHG | HEART RATE: 63 BPM | RESPIRATION RATE: 16 BRPM

## 2024-08-07 PROBLEM — K52.9 ACUTE GASTROENTERITIS: Status: RESOLVED | Noted: 2024-08-06 | Resolved: 2024-08-07

## 2024-08-07 PROBLEM — E87.6 HYPOKALEMIA: Status: RESOLVED | Noted: 2024-08-06 | Resolved: 2024-08-07

## 2024-08-07 LAB

## 2024-08-07 PROCEDURE — 96361 HYDRATE IV INFUSION ADD-ON: CPT

## 2024-08-07 PROCEDURE — 25810000003 LACTATED RINGERS PER 1000 ML: Performed by: INTERNAL MEDICINE

## 2024-08-07 PROCEDURE — G0378 HOSPITAL OBSERVATION PER HR: HCPCS

## 2024-08-07 RX ORDER — FLUTICASONE PROPIONATE 50 MCG
2 SPRAY, SUSPENSION (ML) NASAL DAILY
Qty: 11.1 ML | Refills: 0 | Status: SHIPPED | OUTPATIENT
Start: 2024-08-07

## 2024-08-07 RX ORDER — POTASSIUM CHLORIDE 750 MG/1
40 CAPSULE, EXTENDED RELEASE ORAL ONCE
Status: COMPLETED | OUTPATIENT
Start: 2024-08-07 | End: 2024-08-07

## 2024-08-07 RX ORDER — DICYCLOMINE HCL 20 MG
20 TABLET ORAL EVERY 6 HOURS PRN
Qty: 60 TABLET | Refills: 0 | Status: SHIPPED | OUTPATIENT
Start: 2024-08-07 | End: 2024-08-22

## 2024-08-07 RX ADMIN — RIFAXIMIN 550 MG: 550 TABLET ORAL at 06:45

## 2024-08-07 RX ADMIN — DICYCLOMINE HYDROCHLORIDE 20 MG: 10 CAPSULE ORAL at 12:05

## 2024-08-07 RX ADMIN — POTASSIUM CHLORIDE 40 MEQ: 750 CAPSULE, EXTENDED RELEASE ORAL at 15:45

## 2024-08-07 RX ADMIN — SODIUM CHLORIDE, POTASSIUM CHLORIDE, SODIUM LACTATE AND CALCIUM CHLORIDE 125 ML/HR: 600; 310; 30; 20 INJECTION, SOLUTION INTRAVENOUS at 12:05

## 2024-08-07 RX ADMIN — DICYCLOMINE HYDROCHLORIDE 20 MG: 10 CAPSULE ORAL at 09:26

## 2024-08-07 RX ADMIN — SODIUM CHLORIDE, POTASSIUM CHLORIDE, SODIUM LACTATE AND CALCIUM CHLORIDE 125 ML/HR: 600; 310; 30; 20 INJECTION, SOLUTION INTRAVENOUS at 02:49

## 2024-08-07 RX ADMIN — RIFAXIMIN 550 MG: 550 TABLET ORAL at 13:18

## 2024-08-07 NOTE — SIGNIFICANT NOTE
Wound Eval / Progress Noted     [place]     Patient Name: Ivory Nava  : 1955  MRN: [id number]  Today's Date: 2024                 Admit Date: 2024    Visit Dx:    ICD-10-CM ICD-9-CM   1. Abdominal pain, unspecified abdominal location  R10.9 789.00         Acute gastroenteritis    Hypokalemia        Past Medical History:   Diagnosis Date    Anxiety     Brain tumor (benign)     Cervical cancer     GERD (gastroesophageal reflux disease)     History of chemotherapy     History of radiation therapy     Seasonal allergies         Past Surgical History:   Procedure Laterality Date    BRAIN TUMOR EXCISION      COLONOSCOPY N/A 2022    Procedure: COLONOSCOPY WITH POLYPECTOMY/SNARE/BIOPSIES;  Surgeon: Darrian Almanzar MD;  Location: [facility] ENDOSCOPY;  Service: Gastroenterology;  Laterality: N/A;  COLON POLYP  PROCTITIS    SIGMOIDOSCOPY N/A 2024    Procedure: COLONOSCOPY WITH BIOPSIES AND POLYPECTOMY;  Surgeon: Darrian Almanzar MD;  Location: [facility] ENDOSCOPY;  Service: Gastroenterology;  Laterality: N/A;  COLON POLYP, DIVERTICULOSIS         Physical Assessment:  Wound Bilateral gluteal MASD (Moisture associated skin damage) (Active)   Wound Image   24 1012   Dressing Appearance open to air 24 1012   Closure None 24 1012   Base dry;red;blanchable 24 1012   Periwound dry;intact 24 1012   Periwound Temperature warm 24 1012   Periwound Skin Turgor soft 24 1012   Edges rolled/closed 24 1012   Drainage Amount none 24 1012   Care, Wound cleansed with;sterile normal saline 24 1012   Dressing Care open to air 24 1012      Wound Check / Follow-up: Patient seen today for wound consult.  Patient is awake, alert and oriented at time of visit and agreeable to assessment.    Patient with dry, reddened blanchable tissue to bilateral gluteal aspects.  Patient reports that she has been having frequent loose stools therefore causing  irritation to this area.  All tissue remains intact at this time.  No evidence of pressure injury.  Recommending skin care and skin protection with application of barrier cream.    Patient denies any other wounds or skin issues at this time.      Impression: Blanchable reddened tissue to bilateral gluteal aspects.      Short term goals: Regain skin integrity, skin protection, moisture prevention, skin care.    Jessica Champion RN    8/7/2024    13:21 EDT

## 2024-08-07 NOTE — PLAN OF CARE
Goal Outcome Evaluation:  Plan of Care Reviewed With: patient           Outcome Evaluation: pt to discharge home.

## 2024-08-07 NOTE — PLAN OF CARE
Goal Outcome Evaluation:  Patient alert and oriented, able to make needs known. Patient with multiple liquid stools this shift.  Patient with no acute events thus far this shift.  Continue plan of care.

## 2024-08-07 NOTE — DISCHARGE SUMMARY
[facility]         DISCHARGE SUMMARY    Patient Name: Ivory Nava  : 1955  MRN: [id number]    Date of Admission: 2024  Date of Discharge: 2024  Primary Care Physician: Darrian Oreilly MD    Consults       Date and Time Order Name Status Description    2024  5:21 PM Inpatient Gastroenterology Consult Completed     2024  4:16 PM Inpatient Gastroenterology Consult Completed             Presenting Problem:   Acute gastroenteritis [K52.9]  Abdominal pain, unspecified abdominal location [R10.9]    Active and Resolved Hospital Problems:  Active Hospital Problems   No active problems to display.      Resolved Hospital Problems    Diagnosis POA    **Acute gastroenteritis [K52.9] Yes    Hypokalemia [E87.6] Yes         Hospital Course     Hospital Course:  Ivory Nava is a 68 y.o. female admitted to hospital for nausea vomiting and diarrhea.  Patient has ongoing chronic diarrhea but got worse over last 4 days and had incontinent episodes.  Patient was admitted with severe diarrhea and associated nausea vomiting and hypokalemia.    Started on IV fluids, clear liquids were initiated.  GI was consulted.  Patient was seen by Dr. Hernandez who recommended Xifaxan..  Patient was also given Bentyl for symptomatic treatment, this helped her.  On the day of discharge she was feeling much better her  was at bedside she wanted to go home, patient was restless.  Her diarrhea was still persistent but significantly better than yesterday..    Gastroenterologist cleared her for discharge with outpatient follow-up and recommended colonoscopy if fecal calprotectin elevated.    As patient was feeling better and insisted on going home she was discharged home with outpatient follow-up        DISCHARGE Follow Up Recommendations for labs and diagnostics:   Discharge to home with outpatient follow-up with PCP, repeat labs, follow-up on test results ordered at hospital    Day of Discharge     Vital  Signs:  Temp:  [98.1 °F (36.7 °C)-99.3 °F (37.4 °C)] 98.1 °F (36.7 °C)  Heart Rate:  [60-73] 61  Resp:  [16-18] 18  BP: (110-132)/(51-67) 131/56    Physical Exam:    Elderly female not in acute distress.  Heart regular.  Lungs clear.  Abdomen soft.  Awake alert and oriented.      Pertinent  and/or Most Recent Results     LAB RESULTS:      Lab 08/06/24  1750 08/06/24  1207 08/02/24  0002   WBC  --  7.08 11.32*   HEMOGLOBIN  --  12.9 13.9   HEMATOCRIT  --  38.4 42.3   PLATELETS  --  256 281   NEUTROS ABS  --  4.49 8.79*   IMMATURE GRANS (ABS)  --  0.02 0.04   LYMPHS ABS  --  1.56 1.58   MONOS ABS  --  0.91* 0.79   EOS ABS  --  0.07 0.08   MCV  --  93.0 94.0   SED RATE 3  --   --    CRP 4.83*  --   --    LACTATE  --  1.1 0.8         Lab 08/06/24  1207 08/02/24  0002   SODIUM 137 140   POTASSIUM 3.2* 4.2   CHLORIDE 101 103   CO2 25.5 27.3   ANION GAP 10.5 9.7   BUN 10 13   CREATININE 0.67 0.88   EGFR 94.7 71.2   GLUCOSE 109* 115*   CALCIUM 8.6 9.3         Lab 08/06/24  1207 08/02/24  0002   TOTAL PROTEIN 6.0 6.7   ALBUMIN 3.5 3.9   GLOBULIN 2.5 2.8   ALT (SGPT) 11 9   AST (SGOT) 18 17   BILIRUBIN 0.4 0.5   ALK PHOS 70 89   LIPASE 13 21                     Brief Urine Lab Results  (Last result in the past 365 days)        Color   Clarity   Blood   Leuk Est   Nitrite   Protein   CREAT   Urine HCG        08/06/24 1309 Yellow   Clear   Negative   Negative   Negative   Negative                 Microbiology Results (last 10 days)       Procedure Component Value - Date/Time    Gastrointestinal Panel, PCR - Stool, Per Rectum [[id number]]  (Normal) Collected: 08/06/24 9399    Lab Status: Final result Specimen: Stool from Per Rectum Updated: 08/07/24 0037     Campylobacter Not Detected     Plesiomonas shigelloides Not Detected     Salmonella Not Detected     Vibrio Not Detected     Vibrio cholerae Not Detected     Yersinia enterocolitica Not Detected     Enteroaggregative E. coli (EAEC) Not Detected     Enteropathogenic E. coli  (EPEC) Not Detected     Enterotoxigenic E. coli (ETEC) lt/st Not Detected     Shiga-like toxin-producing E. coli (STEC) stx1/stx2 Not Detected     Shigella/Enteroinvasive E. coli (EIEC) Not Detected     Cryptosporidium Not Detected     Cyclospora cayetanensis Not Detected     Entamoeba histolytica Not Detected     Giardia lamblia Not Detected     Adenovirus F40/41 Not Detected     Astrovirus Not Detected     Norovirus GI/GII Not Detected     Rotavirus A Not Detected     Sapovirus (I, II, IV or V) Not Detected    Narrative:      If Aeromonas, Staphylococcus aureus or Bacillus cereus are suspected, please order [id number]: Stool Culture, Aeromonas, S aureus, B Cereus.    Enteric Bacterial Panel - Stool, Per Rectum [[id number]]  (Normal) Collected: 08/02/24 0029    Lab Status: Final result Specimen: Stool from Per Rectum Updated: 08/02/24 1223     Salmonella Not Detected     Campylobacter Not Detected     Shigella/Enteroinvasive E. coli (EIEC) Not Detected     Shiga-like toxin-producing E. coli (STEC) stx1/stx2 Not Detected    Enteric Parasite Panel - Stool, Per Rectum [[id number]]  (Normal) Collected: 08/02/24 0029    Lab Status: Final result Specimen: Stool from Per Rectum Updated: 08/04/24 0715     Giardia lamblia Not Detected     Cryptosporidium Not Detected     Entamoeba histolytica Not Detected    Clostridioides difficile Toxin - Stool, Per Rectum [[id number]] Collected: 08/02/24 0029    Lab Status: Final result Specimen: Stool from Per Rectum Updated: 08/02/24 0250    Narrative:      The following orders were created for panel order Clostridioides difficile Toxin - Stool, Per Rectum.  Procedure                               Abnormality         Status                     ---------                               -----------         ------                     Clostridioides difficile...[[id number]]                      Final result                 Please view results for these tests on the individual orders.     Clostridioides difficile Toxin, PCR - Stool, Per Rectum [[id number]] Collected: 08/02/24 0029    Lab Status: Final result Specimen: Stool from Per Rectum Updated: 08/02/24 0250     Toxigenic C. difficile by PCR Negative     027 Toxin Presumptive Negative    Narrative:      The result indicates the absence of toxigenic C. difficile from stool specimen.             PROCEDURES:    CT Abdomen Pelvis With Contrast    Result Date: 8/2/2024  Impression: There is possible age-indeterminate infectious/inflammatory enterocolitis. An associated mechanical small bowel obstruction cannot be excluded, as discussed. No pneumoperitoneum or pneumatosis. No portal or mesenteric venous gas. Please see above comments for further detail.   Please note that portions of this note were completed with a voice recognition program.   Electronically Signed By-Roberto Langston MD On:8/2/2024 3:03 AM      US Gallbladder    Result Date: 8/2/2024  Impression: At least one gallstone is seen without definite acute cholecystitis by ultrasound examination. Gallbladder polyps are also suspected. No biliary ductal dilatation is seen. The other findings are as discussed above.   Please note that portions of this note were completed with a voice recognition program.       Electronically Signed By-Roberto Langston MD On:8/2/2024 2:09 AM                   Labs Pending at Discharge:  Pending Labs       Order Current Status    Calprotectin, Fecal - Stool, Per Rectum In process    Stool Culture (Reference Lab) - Stool, Per Rectum In process              Discharge Details        Discharge Medications        New Medications        Instructions Start Date   riFAXIMin 550 MG tablet  Commonly known as: XIFAXAN   550 mg, Oral, Every 8 Hours Scheduled             Changes to Medications        Instructions Start Date   dicyclomine 20 MG tablet  Commonly known as: BENTYL  What changed:   when to take this  reasons to take this   20 mg, Oral, Every 6 Hours PRN              Continue These Medications        Instructions Start Date   fluticasone 50 MCG/ACT nasal spray  Commonly known as: FLONASE   2 sprays, Nasal, Daily      loratadine 10 MG tablet  Commonly known as: CLARITIN   10 mg, Oral, Daily      multivitamin with minerals tablet tablet   1 tablet, Oral, Every 24 Hours      ondansetron ODT 4 MG disintegrating tablet  Commonly known as: ZOFRAN-ODT   8 mg, Translingual, Every 8 Hours PRN      PARoxetine 20 MG tablet  Commonly known as: PAXIL   20 mg, Oral, Nightly      promethazine 12.5 MG tablet  Commonly known as: PHENERGAN   12.5 mg, Oral, Every 6 Hours PRN               Allergies   Allergen Reactions    Azithromycin Other (See Comments)     tonung pilling and swelling     Penicillins Anaphylaxis    Lorazepam Mental Status Change and Unknown - High Severity     Suicidal    Sulfa Antibiotics Nausea Only and Unknown - High Severity         Discharge Disposition:  Discharged home      Diet:  Regular as tolerated      Discharge Activity:     Activity as tolerated    Future Appointments   Date Time Provider Department Center   8/21/2024  4:00 PM Darrian Almanzar MD [facility] [place] [facility]           Time spent on Discharge including face to face service: 33 minutes.            I have dictated this note utilizing Dragon Dictation.             Please note that portions of this note were completed with a voice recognition program.             Part of this note may be an electronic transcription/translation of spoken language to printed text         using the Dragon Dictation System.       Electronically signed by Sanford Christensen MD, 08/07/24, 2:08 PM EDT.

## 2024-08-07 NOTE — OUTREACH NOTE
Prep Survey      Flowsheet Row Responses   [facility] facility patient discharged from? [place]   Is LACE score < 7 ? Yes   Eligibility Not Eligible   What are the reasons patient is not eligible? Other  [low risk for readmit]   Does the patient have one of the following disease processes/diagnoses(primary or secondary)? Other   Prep survey completed? Yes            Sophia KEVIN - Registered Nurse

## 2024-08-09 LAB
BACTERIA SPEC CULT: NORMAL
E COLI SXT STL QL IA: NEGATIVE
SALM + SHIG STL CULT: NORMAL

## 2024-08-11 LAB — CALPROTECTIN STL-MCNT: 507 UG/G (ref 0–120)

## 2024-08-15 ENCOUNTER — TELEPHONE (OUTPATIENT)
Dept: GASTROENTEROLOGY | Facility: CLINIC | Age: 69
End: 2024-08-15
Payer: MEDICARE

## 2024-08-15 NOTE — TELEPHONE ENCOUNTER
Hub staff attempted to follow warm transfer process and was unsuccessful     Caller: Ivory Nava    Relationship to patient: Self    Best call back number: [phone]    Patient is needing: PT RETURNING CALL TO OFFICE, PLEASE REACH BACK OUT TO PT. OK TO LEAVE DETAILED MESSAGE VM.

## 2024-08-15 NOTE — TELEPHONE ENCOUNTER
----- Message from April C sent at 8/15/2024  6:50 AM EDT -----    ----- Message -----  From: Brittani Oseguera APRN  Sent: 8/14/2024   5:03 PM EDT  To: [facility]    I have rev'd all with Dr Almanzar and he would like to check and see how pt is doing now and go from there

## 2024-08-17 LAB
BACTERIA SPEC CULT: NORMAL
CAMPYLOBACTER STL CULT: NORMAL
E COLI SXT STL QL IA: NEGATIVE
SALM + SHIG STL CULT: NORMAL

## 2024-09-18 ENCOUNTER — TELEPHONE (OUTPATIENT)
Dept: GASTROENTEROLOGY | Facility: CLINIC | Age: 69
End: 2024-09-18

## 2024-10-17 ENCOUNTER — HOSPITAL ENCOUNTER (OUTPATIENT)
Dept: BONE DENSITY | Facility: HOSPITAL | Age: 69
Discharge: HOME | End: 2024-10-17
Payer: MEDICARE

## 2024-11-13 ENCOUNTER — OFFICE VISIT (OUTPATIENT)
Dept: GASTROENTEROLOGY | Facility: CLINIC | Age: 69
End: 2024-11-13
Payer: MEDICARE

## 2024-11-13 VITALS
DIASTOLIC BLOOD PRESSURE: 56 MMHG | WEIGHT: 134 LBS | BODY MASS INDEX: 22.33 KG/M2 | HEIGHT: 65 IN | SYSTOLIC BLOOD PRESSURE: 132 MMHG

## 2024-11-13 DIAGNOSIS — R19.7 DIARRHEA, UNSPECIFIED TYPE: Primary | ICD-10-CM

## 2024-11-13 NOTE — PROGRESS NOTES
"Chief Complaint  Follow-up (Patient stated no issues at this time. )    Subjective          History of Present Illness  Ivory Nava presents to [facility].  Patient had diarrhea a few months ago but the diarrhea is completely resolved patient is having normal bowel movements which are formed there is no rectal bleeding.  Patient denies any abdominal cramps or pain.  Her weight is coming back her appetite is good.  Patient has no trouble with nausea vomiting heartburn or dysphagia.  She feels really good.     Objective   Vital Signs:   /56 (BP Location: Right arm, Patient Position: Sitting, Cuff Size: Adult)   Ht 165 cm (64.96\")   Wt 60.8 kg (134 lb)   BMI 22.33 kg/m²     Physical Exam  Constitutional:       General: She is awake. She is not in acute distress.     Appearance: Normal appearance. She is well-developed and well-groomed.   HENT:      Head: Normocephalic and atraumatic.      Mouth/Throat:      Mouth: Mucous membranes are moist.      Comments: To dental hygiene is good  Eyes:      General: Lids are normal.      Conjunctiva/sclera: Conjunctivae normal.      Pupils: Pupils are equal, round, and reactive to light.   Neck:      Thyroid: No thyroid mass.      Trachea: Trachea normal.   Cardiovascular:      Rate and Rhythm: Normal rate and regular rhythm.      Heart sounds: Normal heart sounds.   Pulmonary:      Effort: Pulmonary effort is normal.      Breath sounds: Normal breath sounds and air entry.   Abdominal:      General: Abdomen is flat. Bowel sounds are normal. There is no distension.      Palpations: Abdomen is soft. There is no mass.      Tenderness: There is no abdominal tenderness. There is no guarding.   Musculoskeletal:      Cervical back: Neck supple.      Right lower leg: No edema.      Left lower leg: No edema.   Skin:     General: Skin is warm and moist.      Coloration: Skin is not cyanotic, jaundiced or pale.      Findings: No rash.      " Nails: There is no clubbing.   Neurological:      Mental Status: She is alert and oriented to person, place, and time.   Psychiatric:         Attention and Perception: Attention normal.         Mood and Affect: Mood and affect normal.         Speech: Speech normal.        Result Review :     Assessment and Plan    Diagnoses and all orders for this visit:    1. Diarrhea, unspecified type (Primary)  Comments:  Resolved    PLAN:  Follow-Up as needed.     Follow Up   No follow-ups on file.  Patient was given instructions and counseling regarding her condition or for health maintenance advice. Please see specific information pulled into the AVS if appropriate.

## (undated) DEVICE — SNAR E/S POLYP SNAREMASTER OVL/10MM 2.8X2300MM YEL

## (undated) DEVICE — THE SINGLE USE ETRAP – POLYP TRAP IS USED FOR SUCTION RETRIEVAL OF ENDOSCOPICALLY REMOVED POLYPS.: Brand: ETRAP

## (undated) DEVICE — CONN JET HYDRA H20 AUXILIARY DISP

## (undated) DEVICE — SOL IRRG H2O PL/BG 1000ML STRL

## (undated) DEVICE — Device: Brand: DEFENDO AIR/WATER/SUCTION AND BIOPSY VALVE

## (undated) DEVICE — COLON KIT: Brand: MEDLINE INDUSTRIES, INC.

## (undated) DEVICE — Device

## (undated) DEVICE — SOLIDIFIER LIQLOC PLS 1500CC BT

## (undated) DEVICE — SINGLE-USE BIOPSY FORCEPS: Brand: RADIAL JAW 4

## (undated) DEVICE — LINER SURG CANSTR SXN S/RIGD 1500CC